# Patient Record
Sex: FEMALE | Race: WHITE | NOT HISPANIC OR LATINO | Employment: UNEMPLOYED | URBAN - METROPOLITAN AREA
[De-identification: names, ages, dates, MRNs, and addresses within clinical notes are randomized per-mention and may not be internally consistent; named-entity substitution may affect disease eponyms.]

---

## 2019-04-19 ENCOUNTER — OFFICE VISIT CONVERTED (OUTPATIENT)
Dept: OTOLARYNGOLOGY | Facility: CLINIC | Age: 34
End: 2019-04-19
Attending: OTOLARYNGOLOGY

## 2019-09-10 ENCOUNTER — OFFICE VISIT CONVERTED (OUTPATIENT)
Dept: ORTHOPEDIC SURGERY | Facility: CLINIC | Age: 34
End: 2019-09-10
Attending: ORTHOPAEDIC SURGERY

## 2019-11-05 ENCOUNTER — OFFICE VISIT CONVERTED (OUTPATIENT)
Dept: ORTHOPEDIC SURGERY | Facility: CLINIC | Age: 34
End: 2019-11-05
Attending: ORTHOPAEDIC SURGERY

## 2019-11-14 ENCOUNTER — HOSPITAL ENCOUNTER (OUTPATIENT)
Dept: MRI IMAGING | Facility: HOSPITAL | Age: 34
Discharge: HOME | End: 2019-11-14
Attending: ORTHOPAEDIC SURGERY

## 2019-11-19 ENCOUNTER — OFFICE VISIT CONVERTED (OUTPATIENT)
Dept: ORTHOPEDIC SURGERY | Facility: CLINIC | Age: 34
End: 2019-11-19
Attending: ORTHOPAEDIC SURGERY

## 2020-07-07 ENCOUNTER — HOSPITAL ENCOUNTER (OUTPATIENT)
Dept: URGENT CARE | Facility: CLINIC | Age: 35
Discharge: HOME | End: 2020-07-07
Attending: NURSE PRACTITIONER

## 2020-08-05 ENCOUNTER — OFFICE VISIT CONVERTED (OUTPATIENT)
Dept: OTOLARYNGOLOGY | Facility: CLINIC | Age: 35
End: 2020-08-05
Attending: OTOLARYNGOLOGY

## 2020-08-05 ENCOUNTER — HOSPITAL ENCOUNTER (OUTPATIENT)
Dept: GENERAL RADIOLOGY | Facility: HOSPITAL | Age: 35
Discharge: HOME | End: 2020-08-05
Attending: OTOLARYNGOLOGY

## 2020-10-03 ENCOUNTER — HOSPITAL ENCOUNTER (OUTPATIENT)
Dept: URGENT CARE | Facility: CLINIC | Age: 35
Discharge: HOME | End: 2020-10-03
Attending: FAMILY MEDICINE

## 2020-10-05 LAB — BACTERIA SPEC AEROBE CULT: NORMAL

## 2021-03-12 ENCOUNTER — OFFICE VISIT CONVERTED (OUTPATIENT)
Dept: ORTHOPEDIC SURGERY | Facility: CLINIC | Age: 36
End: 2021-03-12
Attending: ORTHOPAEDIC SURGERY

## 2021-05-13 NOTE — PROGRESS NOTES
"   Progress Note      Patient Name: Paula Lowe   Patient ID: [id number]   Sex: Female   YOB: 1985    Primary Care Provider: Marie BEGUM   Referring Provider: Marie BEGUM    Visit Date: August 5, 2020    Provider: Luís Martínez MD   Location: [facility]   Location Address: [address] [place] [address], [address] [place] [address]  [id number]   Location Phone: [phone]          Chief Complaint     \"My sinuses have been acting up again.\"       History Of Present Illness     Paula Lowe is a 35 year old female with previous history of tobacco abuse who returns today for evaluation of her sinuses.  She was originally seen on 4/19/2019 at which time she reported frequent left greater than right facial and ear pressure associated with intermittent nasal congestion, sore throat, clear rhinorrhea, and cough.  She tells me that she developed severe bilateral facial and ear pressure on 7/3/2020.  This is been associated with frequent throat clearing and occasional cough.  She also reports some photophobia and phonophobia but denies any nausea or vomiting.  She has been taking Tylenol and Claritin without improvement.  She is also tried Flonase and even was on a 10-day course of Augmentin which she completed on 7/19/2020.  She feels like the Augmentin was helpful but her symptoms never went away fully.  She denies any rhinorrhea, congestion, diminished sense of smell, or epistaxis.  She does have a history of clenching her teeth.  She has not undergone any imaging.           Past Medical History  Chronic rhinitis; Ear pressure, bilateral; Facial pressure; Heart murmur; Nasal congestion; Seasonal allergies         Past Surgical History  Tonsillectomy         Medication List  Claritin 10 mg oral tablet         Allergy List  Latex Exam Gloves         Family Medical History  Melanoma; Basal cell carcinoma         Social History  Alcohol Use (Never); lives with children; lives with " "spouse; .; Recreational Drug Use (Never); Tobacco (Former); Working         Review of Systems  · Constitutional  o Denies  o : fever, night sweats, weight loss  · Eyes  o Denies  o : discharge from eye, impaired vision  · HENT  o Admits  o : *See HPI  · Cardiovascular  o Admits  o : irregular heart beats  o Denies  o : chest pain  · Respiratory  o Denies  o : shortness of breath, wheezing, coughing up blood  · Gastrointestinal  o Admits  o : heartburn, reflux  o Denies  o : vomiting blood  · Genitourinary  o Denies  o : frequency  · Integument  o Denies  o : rash, skin dryness  · Neurologic  o Denies  o : seizures, loss of balance, loss of consciousness, dizziness  · Endocrine  o Denies  o : cold intolerance, heat intolerance  · Heme-Lymph  o Denies  o : easy bleeding, anemia      Vitals  Date Time BP Position Site L\R Cuff Size HR RR TEMP (F) WT  HT  BMI kg/m2 BSA m2 O2 Sat HC       08/05/2020 08:34 AM        98.4 212lbs 4oz 5'  2\" 38.82 2.05           Physical Examination  · Constitutional  o Appearance  o : well developed, well-nourished, alert and in no acute distress, voice clear and strong  · Head and Face  o Head  o :   § Inspection  § : no deformities or lesions  o Face  o :   § Inspection  § : No facial lesions; House-Brackmann I/VI bilaterally  § Palpation  § : TMJ crepitus with mild  muscle tenderness bilaterally  · Eyes  o Vision  o :   § Visual Fields  § : Extraocular movements are intact. No spontaneous or gaze-induced nystagmus.  o Conjunctivae  o : clear  o Sclerae  o : clear  o Pupils and Irises  o : pupils equal, round, and reactive to light.   · Ears, Nose, Mouth and Throat  o Ears  o :   § External Ears  § : appearance within normal limits, no lesions present  § Otoscopic Examination  § : tympanic membrane appearance within normal limits bilaterally without perforations, well-aerated middle ears  § Hearing  § : intact to conversational voice both ears  o Nose  o :   § External " Nose  § : appearance normal  § Intranasal Exam  § : mucosa within normal limits, vestibules normal, no intranasal lesions present, septum deviated to the left, sinuses non tender to percussion  o Oral Cavity  o :   § Oral Mucosa  § : oral mucosa normal without pallor or cyanosis  § Lips  § : lip appearance normal  § Teeth  § : normal dentition for age  § Gums  § : gums pink, non-swollen, no bleeding present  § Tongue  § : tongue appearance normal; normal mobility  § Palate  § : hard palate normal, soft palate appearance normal with symmetric mobility  o Throat  o :   § Oropharynx  § : no inflammation or lesions present, tonsils surgically absent  § Hypopharynx  § : Deferred secondary to gag reflex  § Larynx  § : Deferred secondary to gag reflex  · Neck  o Inspection/Palpation  o : normal appearance, no masses or tenderness, trachea midline; thyroid size normal, nontender, no nodules or masses present on palpation  · Respiratory  o Respiratory Effort  o : breathing unlabored  o Inspection of Chest  o : normal appearance, no retractions  · Cardiovascular  o Heart  o : regular rate and rhythm  · Lymphatic  o Neck  o : no lymphadenopathy present  o Supraclavicular Nodes  o : no lymphadenopathy present  o Preauricular Nodes  o : no lymphadenopathy present  · Skin and Subcutaneous Tissue  o General Inspection  o : Regarding face and neck - there are no rashes present, no lesions present, and no areas of discoloration  · Neurologic  o Cranial Nerves  o : cranial nerves II-XII are grossly intact bilaterally  o Gait and Station  o : normal gait, able to stand without diffculty  · Psychiatric  o Judgement and Insight  o : judgment and insight intact  o Mood and Affect  o : mood normal, affect appropriate          Results     Nasal endoscopy was deferred by the patient.       Assessment  · Facial pain, atypical     350.2/G50.1  · Chronic rhinosinusitis     473.9/J32.9  · Myofascial muscle pain     729.1/M79.18    Problems  Reconciled  Plan  · Orders  o CT Maxillofacial Area without IV Contrast [facility] (39490) - 350.2/G50.1, 473.9/J32.9 - 08/05/2020  · Medications  o Medications have been Reconciled  o Transition of Care or Provider Policy  · Instructions  o Impressions and findings were discussed with Ms. Lowe at great length. Currently, she reports 1 month of bilateral facial and ear pressure which is often associated with photophobia and phonophobia, throat clearing, and occasional cough. Examination today reveals  muscle tenderness to palpation but is otherwise unremarkable. We discussed the differential including intractable migraine versus myofascial muscle pain versus chronic rhinosinusitis. We discussed the utility of performing nasal endoscopy in clinic but she would like to avoid this if at all possible. Therefore, I recommended she undergo a CT scan to evaluate for chronic rhinosinusitis. If this is present she will be treated with a 28-day course of Cipro and if absent we will consider management of potential migraine versus myofascial pain.            Electronically Signed by: Luís Martínez MD -Author on August 5, 2020 10:05:09 AM

## 2021-05-14 VITALS — HEIGHT: 63 IN | WEIGHT: 225 LBS | BODY MASS INDEX: 39.87 KG/M2

## 2021-05-14 NOTE — PROGRESS NOTES
Progress Note      Patient Name: Paula Lowe   Patient ID: [id number]   Sex: Female   YOB: 1985    Primary Care Provider: Marie BEGUM   Referring Provider: Marie BEGUM    Visit Date: March 12, 2021    Provider: Randall Martinez MD   Location: [facility]   Location Address: [address] [place] [address]  [id number]   Location Phone: [phone]          Chief Complaint  · Right knee pain      History Of Present Illness  Paula Lowe is a 35 year old female who presents today to [facility].      The patient presents here today for follow up evaluation of her right knee. She reports pain to the medial and posterior lateral part of the knee. 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 She previously had an MRI over a year ago that shown early osteoarthritis. She has tried physical therapy. She reports instability in her knee.       Past Medical History  Chronic rhinitis; Ear pressure, bilateral; Facial pressure; Heart murmur; Nasal congestion; Seasonal allergies         Past Surgical History  Tonsillectomy         Medication List  Claritin 10 mg oral tablet         Allergy List  Latex Exam Gloves       Allergies Reconciled  Family Medical History  Cancer, Unspecified; Melanoma; Basal cell carcinoma         Social History  Alcohol Use (Never); lives with children; lives with spouse; .; Recreational Drug Use (Never); Tobacco (Former); Unemployed.; Working         Review of Systems  · Constitutional  o Denies  o : fever, chills, weight loss  · Cardiovascular  o Denies  o : chest pain, shortness of breath  · Gastrointestinal  o Denies  o : liver disease, heartburn, nausea, blood in stools  · Genitourinary  o Denies  o : painful urination, blood in urine  · Integument  o Denies  o : rash, itching  · Neurologic  o Denies  o : headache, weakness, loss of consciousness  · Musculoskeletal  o Denies  o : painful, swollen  "joints  · Psychiatric  o Denies  o : drug/alcohol addiction, anxiety, depression      Vitals  Date Time BP Position Site L\R Cuff Size HR RR TEMP (F) WT  HT  BMI kg/m2 BSA m2 O2 Sat FR L/min FiO2 HC       03/12/2021 10:46 AM         224lbs 16oz 5'  3\" 39.86 2.13             Physical Examination  · Constitutional  o Appearance  o : well developed, well-nourished, no obvious deformities present  · Head and Face  o Head  o :   § Inspection  § : normocephalic  o Face  o :   § Inspection  § : no facial lesions  · Eyes  o Conjunctivae  o : conjunctivae normal  o Sclerae  o : sclerae white  · Ears, Nose, Mouth and Throat  o Ears  o :   § External Ears  § : appearance within normal limits  § Hearing  § : intact  o Nose  o :   § External Nose  § : appearance normal  · Neck  o Inspection/Palpation  o : normal appearance  o Range of Motion  o : full range of motion  · Respiratory  o Respiratory Effort  o : breathing unlabored  o Inspection of Chest  o : normal appearance  o Auscultation of Lungs  o : no audible wheezing or rales  · Cardiovascular  o Heart  o : regular rate  · Gastrointestinal  o Abdominal Examination  o : soft and non-tender  · Skin and Subcutaneous Tissue  o General Inspection  o : intact, no rashes  · Psychiatric  o General  o : Alert and oriented x3  o Judgement and Insight  o : judgment and insight intact  o Mood and Affect  o : mood normal, affect appropriate  · Right Knee  o Inspection  o : Extension 0. Flexion 125. Stable to varus and valgus stress. Stable to anterior and posterior drawer. tender on medial joint line. Positive EHL, FHL, GS, and TA. Sensation intact to light touch all 5 nerves of the foot. Positive Pulses.   · In Office Procedures  o View  o : LAT/SUNRISE/STANDING  o Site  o : right, knee  o Indication  o : Right knee pain  o Study  o : X-rays ordered, taken in the office, and reviewed today.  o Xray  o : Negative fracture, dislocation, subluxation. Mild degenerative changes to patella " femoral joint  o Comparative Data  o : Comparative Data found and reviewed today          Assessment  · Right knee pain, unspecified chronicity     719.46/M25.561  · Knee instability, right     718.86/M25.361      Plan  · Orders  o Knee (Right) [facility] Preferred View ([id number]) - 719.46/M25.561 - 03/12/2021  · Medications  o Medications have been Reconciled  o Transition of Care or Provider Policy  · Instructions  o Reviewed the patient's Past Medical, Social, and Family history as well as the ROS at today's visit, no changes.  o Call or return if worsening symptoms.  o The above service was scribed by Beverly Rodrigez on my behalf and I attest to the accuracy of the note. jsb  o Discussed the treatment options with the patient. Plan for a new MRI without contrast. Knee brace given today. Follow up after MRI for results.   o Electronically Identified Patient Education Materials Provided Electronically            Electronically Signed by: Beverly Rodrigez MA -Author on March 15, 2021 09:25:05 AM  Electronically Co-signed by: Randall Martinez MD -Reviewer on March 15, 2021 01:05:55 PM

## 2021-05-15 VITALS — HEIGHT: 62 IN | HEART RATE: 103 BPM | OXYGEN SATURATION: 94 %

## 2021-05-15 VITALS — OXYGEN SATURATION: 98 % | WEIGHT: 226.5 LBS | HEART RATE: 113 BPM | HEIGHT: 62 IN | BODY MASS INDEX: 41.68 KG/M2

## 2021-05-15 VITALS
HEART RATE: 86 BPM | SYSTOLIC BLOOD PRESSURE: 104 MMHG | BODY MASS INDEX: 36.8 KG/M2 | RESPIRATION RATE: 15 BRPM | OXYGEN SATURATION: 98 % | DIASTOLIC BLOOD PRESSURE: 60 MMHG | TEMPERATURE: 98.4 F | HEIGHT: 62 IN | WEIGHT: 200 LBS

## 2021-05-15 VITALS — BODY MASS INDEX: 39.06 KG/M2 | TEMPERATURE: 98.4 F | WEIGHT: 212.25 LBS | HEIGHT: 62 IN

## 2021-05-15 VITALS — HEART RATE: 104 BPM | OXYGEN SATURATION: 97 % | HEIGHT: 62 IN

## 2021-05-18 ENCOUNTER — HOSPITAL ENCOUNTER (OUTPATIENT)
Dept: MRI IMAGING | Facility: HOSPITAL | Age: 36
Discharge: HOME | End: 2021-05-18
Attending: ORTHOPAEDIC SURGERY

## 2021-06-10 ENCOUNTER — OFFICE VISIT (OUTPATIENT)
Dept: ORTHOPEDIC SURGERY | Facility: CLINIC | Age: 36
End: 2021-06-10

## 2021-06-10 VITALS — HEART RATE: 97 BPM | WEIGHT: 215 LBS | HEIGHT: 63 IN | BODY MASS INDEX: 38.09 KG/M2 | OXYGEN SATURATION: 100 %

## 2021-06-10 DIAGNOSIS — M71.21 BAKER'S CYST OF KNEE, RIGHT: ICD-10-CM

## 2021-06-10 DIAGNOSIS — M94.20 CHONDROMALACIA: Primary | ICD-10-CM

## 2021-06-10 PROCEDURE — 99213 OFFICE O/P EST LOW 20 MIN: CPT | Performed by: ORTHOPAEDIC SURGERY

## 2021-06-10 RX ORDER — LORATADINE 10 MG/1
10 TABLET ORAL DAILY PRN
COMMUNITY

## 2021-06-10 RX ORDER — IBUPROFEN 200 MG
TABLET ORAL EVERY 6 HOURS
COMMUNITY
End: 2021-06-10

## 2021-06-10 RX ORDER — MELOXICAM 15 MG/1
15 TABLET ORAL DAILY
Qty: 30 TABLET | Refills: 2 | Status: SHIPPED | OUTPATIENT
Start: 2021-06-10 | End: 2022-04-01
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
## 2021-06-24 ENCOUNTER — APPOINTMENT (OUTPATIENT)
Dept: CT IMAGING | Facility: HOSPITAL | Age: 36
End: 2021-06-24

## 2021-06-24 ENCOUNTER — HOSPITAL ENCOUNTER (EMERGENCY)
Facility: HOSPITAL | Age: 36
Discharge: HOME | End: 2021-06-24
Attending: EMERGENCY MEDICINE | Admitting: EMERGENCY MEDICINE

## 2021-06-24 VITALS
RESPIRATION RATE: 20 BRPM | DIASTOLIC BLOOD PRESSURE: 78 MMHG | HEIGHT: 63 IN | WEIGHT: 228.84 LBS | SYSTOLIC BLOOD PRESSURE: 122 MMHG | TEMPERATURE: 97.6 F | BODY MASS INDEX: 40.55 KG/M2 | HEART RATE: 74 BPM | OXYGEN SATURATION: 98 %

## 2021-06-24 DIAGNOSIS — N39.0 ACUTE UTI: Primary | ICD-10-CM

## 2021-06-24 LAB
ALBUMIN SERPL-MCNC: 4.4 G/DL (ref 3.5–5.2)
ALBUMIN/GLOB SERPL: 1.5 G/DL
ALP SERPL-CCNC: 80 U/L (ref 39–117)
ALT SERPL W P-5'-P-CCNC: 18 U/L (ref 1–33)
ANION GAP SERPL CALCULATED.3IONS-SCNC: 13.3 MMOL/L (ref 5–15)
AST SERPL-CCNC: 18 U/L (ref 1–32)
BACTERIA UR QL AUTO: ABNORMAL /HPF
BASOPHILS # BLD AUTO: 0.04 10*3/MM3 (ref 0–0.2)
BASOPHILS NFR BLD AUTO: 0.7 % (ref 0–1.5)
BILIRUB SERPL-MCNC: 0.7 MG/DL (ref 0–1.2)
BILIRUB UR QL STRIP: NEGATIVE
BUN SERPL-MCNC: 8 MG/DL (ref 6–20)
BUN/CREAT SERPL: 10.1 (ref 7–25)
CALCIUM SPEC-SCNC: 9.2 MG/DL (ref 8.6–10.5)
CHLORIDE SERPL-SCNC: 104 MMOL/L (ref 98–107)
CLARITY UR: ABNORMAL
CO2 SERPL-SCNC: 18.7 MMOL/L (ref 22–29)
COLOR UR: ABNORMAL
CREAT SERPL-MCNC: 0.79 MG/DL (ref 0.57–1)
DEPRECATED RDW RBC AUTO: 40.9 FL (ref 37–54)
EOSINOPHIL # BLD AUTO: 0.2 10*3/MM3 (ref 0–0.4)
EOSINOPHIL NFR BLD AUTO: 3.7 % (ref 0.3–6.2)
ERYTHROCYTE [DISTWIDTH] IN BLOOD BY AUTOMATED COUNT: 13.2 % (ref 12.3–15.4)
GFR SERPL CREATININE-BSD FRML MDRD: 83 ML/MIN/1.73
GLOBULIN UR ELPH-MCNC: 2.9 GM/DL
GLUCOSE SERPL-MCNC: 104 MG/DL (ref 65–99)
GLUCOSE UR STRIP-MCNC: NEGATIVE MG/DL
HCG INTACT+B SERPL-ACNC: <0.5 MIU/ML
HCT VFR BLD AUTO: 38.4 % (ref 34–46.6)
HGB BLD-MCNC: 12.5 G/DL (ref 12–15.9)
HGB UR QL STRIP.AUTO: ABNORMAL
HOLD SPECIMEN: NORMAL
HOLD SPECIMEN: NORMAL
HYALINE CASTS UR QL AUTO: ABNORMAL /LPF
IMM GRANULOCYTES # BLD AUTO: 0.01 10*3/MM3 (ref 0–0.05)
IMM GRANULOCYTES NFR BLD AUTO: 0.2 % (ref 0–0.5)
KETONES UR QL STRIP: ABNORMAL
LEUKOCYTE ESTERASE UR QL STRIP.AUTO: ABNORMAL
LIPASE SERPL-CCNC: 26 U/L (ref 13–60)
LYMPHOCYTES # BLD AUTO: 1.61 10*3/MM3 (ref 0.7–3.1)
LYMPHOCYTES NFR BLD AUTO: 29.5 % (ref 19.6–45.3)
MCH RBC QN AUTO: 27.7 PG (ref 26.6–33)
MCHC RBC AUTO-ENTMCNC: 32.6 G/DL (ref 31.5–35.7)
MCV RBC AUTO: 85 FL (ref 79–97)
MONOCYTES # BLD AUTO: 0.34 10*3/MM3 (ref 0.1–0.9)
MONOCYTES NFR BLD AUTO: 6.2 % (ref 5–12)
NEUTROPHILS NFR BLD AUTO: 3.25 10*3/MM3 (ref 1.7–7)
NEUTROPHILS NFR BLD AUTO: 59.7 % (ref 42.7–76)
NITRITE UR QL STRIP: NEGATIVE
NRBC BLD AUTO-RTO: 0 /100 WBC (ref 0–0.2)
PH UR STRIP.AUTO: 6 [PH] (ref 5–8)
PLATELET # BLD AUTO: 351 10*3/MM3 (ref 140–450)
PMV BLD AUTO: 9.4 FL (ref 6–12)
POTASSIUM SERPL-SCNC: 3.9 MMOL/L (ref 3.5–5.2)
PROT SERPL-MCNC: 7.3 G/DL (ref 6–8.5)
PROT UR QL STRIP: ABNORMAL
RBC # BLD AUTO: 4.52 10*6/MM3 (ref 3.77–5.28)
RBC # UR: ABNORMAL /HPF
REF LAB TEST METHOD: ABNORMAL
SODIUM SERPL-SCNC: 136 MMOL/L (ref 136–145)
SP GR UR STRIP: 1.02 (ref 1–1.03)
SQUAMOUS #/AREA URNS HPF: ABNORMAL /HPF
UROBILINOGEN UR QL STRIP: ABNORMAL
WBC # BLD AUTO: 5.45 10*3/MM3 (ref 3.4–10.8)
WBC UR QL AUTO: ABNORMAL /HPF
WHOLE BLOOD HOLD SPECIMEN: NORMAL

## 2021-06-24 PROCEDURE — 83690 ASSAY OF LIPASE: CPT | Performed by: EMERGENCY MEDICINE

## 2021-06-24 PROCEDURE — 96374 THER/PROPH/DIAG INJ IV PUSH: CPT

## 2021-06-24 PROCEDURE — 80053 COMPREHEN METABOLIC PANEL: CPT | Performed by: EMERGENCY MEDICINE

## 2021-06-24 PROCEDURE — 36415 COLL VENOUS BLD VENIPUNCTURE: CPT | Performed by: EMERGENCY MEDICINE

## 2021-06-24 PROCEDURE — 99283 EMERGENCY DEPT VISIT LOW MDM: CPT

## 2021-06-24 PROCEDURE — 85025 COMPLETE CBC W/AUTO DIFF WBC: CPT | Performed by: EMERGENCY MEDICINE

## 2021-06-24 PROCEDURE — 74177 CT ABD & PELVIS W/CONTRAST: CPT

## 2021-06-24 PROCEDURE — 25010000002 CEFTRIAXONE PER 250 MG: Performed by: EMERGENCY MEDICINE

## 2021-06-24 PROCEDURE — 84702 CHORIONIC GONADOTROPIN TEST: CPT | Performed by: EMERGENCY MEDICINE

## 2021-06-24 PROCEDURE — 0 IOPAMIDOL PER 1 ML: Performed by: EMERGENCY MEDICINE

## 2021-06-24 PROCEDURE — 25010000002 KETOROLAC TROMETHAMINE PER 15 MG: Performed by: EMERGENCY MEDICINE

## 2021-06-24 PROCEDURE — 96375 TX/PRO/DX INJ NEW DRUG ADDON: CPT

## 2021-06-24 PROCEDURE — 81001 URINALYSIS AUTO W/SCOPE: CPT | Performed by: EMERGENCY MEDICINE

## 2021-06-24 RX ORDER — KETOROLAC TROMETHAMINE 30 MG/ML
15 INJECTION, SOLUTION INTRAMUSCULAR; INTRAVENOUS ONCE
Status: COMPLETED | OUTPATIENT
Start: 2021-06-24 | End: 2021-06-24

## 2021-06-24 RX ORDER — SODIUM CHLORIDE 0.9 % (FLUSH) 0.9 %
10 SYRINGE (ML) INJECTION AS NEEDED
Status: DISCONTINUED | OUTPATIENT
Start: 2021-06-24 | End: 2021-06-24 | Stop reason: HOSPADM

## 2021-06-24 RX ORDER — CEPHALEXIN 500 MG/1
500 CAPSULE ORAL 4 TIMES DAILY
Qty: 40 CAPSULE | Refills: 0 | Status: SHIPPED | OUTPATIENT
Start: 2021-06-24 | End: 2022-04-01

## 2021-06-24 RX ADMIN — CEFTRIAXONE SODIUM 1 G: 1 INJECTION, POWDER, FOR SOLUTION INTRAMUSCULAR; INTRAVENOUS at 14:56

## 2021-06-24 RX ADMIN — IOPAMIDOL 100 ML: 755 INJECTION, SOLUTION INTRAVENOUS at 16:30

## 2021-06-24 RX ADMIN — KETOROLAC TROMETHAMINE 15 MG: 30 INJECTION, SOLUTION INTRAMUSCULAR; INTRAVENOUS at 14:56

## 2021-11-09 ENCOUNTER — TELEPHONE (OUTPATIENT)
Dept: OBSTETRICS AND GYNECOLOGY | Facility: CLINIC | Age: 36
End: 2021-11-09

## 2021-11-09 DIAGNOSIS — N93.9 ABNORMAL UTERINE BLEEDING (AUB): Primary | ICD-10-CM

## 2021-11-09 RX ORDER — MEDROXYPROGESTERONE ACETATE 10 MG/1
10 TABLET ORAL DAILY
Qty: 10 TABLET | Refills: 0 | Status: SHIPPED | OUTPATIENT
Start: 2021-11-09 | End: 2022-04-01

## 2021-11-09 NOTE — TELEPHONE ENCOUNTER
I sent a prescription for Provera for the next 10 days to her pharmacy.  Start that today to help decrease the AUB

## 2021-11-09 NOTE — TELEPHONE ENCOUNTER
Spoke with pt and let her know RX for provera has been sent to her pharmacy. Adv to take for the full ten days even  if she were to stop bleeding and keep fu with Erica

## 2021-11-09 NOTE — TELEPHONE ENCOUNTER
Pt called stating she has been bleeding for 19 days, she states it is heavy to where she is changing her pad and bleeding through her clothes every couple of hours. She is having some cramping with it. I have scheduled her an appt with Erica for next week but she wanted to me to check with you to see if there was any other recommendations since you have seen her for the issue. I will put her chart in your area.

## 2021-11-15 ENCOUNTER — OFFICE VISIT (OUTPATIENT)
Dept: OBSTETRICS AND GYNECOLOGY | Facility: CLINIC | Age: 36
End: 2021-11-15

## 2021-11-15 VITALS
BODY MASS INDEX: 42.43 KG/M2 | DIASTOLIC BLOOD PRESSURE: 83 MMHG | SYSTOLIC BLOOD PRESSURE: 118 MMHG | WEIGHT: 232 LBS | HEART RATE: 92 BPM

## 2021-11-15 DIAGNOSIS — N94.6 DYSMENORRHEA: ICD-10-CM

## 2021-11-15 DIAGNOSIS — N92.1 MENORRHAGIA WITH IRREGULAR CYCLE: Primary | ICD-10-CM

## 2021-11-15 LAB
B-HCG UR QL: NEGATIVE
EXPIRATION DATE: NORMAL
INTERNAL NEGATIVE CONTROL: NEGATIVE
INTERNAL POSITIVE CONTROL: POSITIVE
Lab: NORMAL

## 2021-11-15 PROCEDURE — 81025 URINE PREGNANCY TEST: CPT | Performed by: OBSTETRICS & GYNECOLOGY

## 2021-11-15 PROCEDURE — 99214 OFFICE O/P EST MOD 30 MIN: CPT | Performed by: OBSTETRICS & GYNECOLOGY

## 2021-11-15 NOTE — PROGRESS NOTES
GYN Problem/Follow Up Visit    Chief Complaint   Patient presents with   • Follow-up     AUB           HPI  Paula Lowe is a 36 y.o. female, No obstetric history on file., who presents for heavy painful menses. States periods have been getting worse for several months. Will sometimes have two menses in one month. Saw dr moe in august for same sx. Workup normal except 2.5 cm lt ovarian cyst. Had discussed mirena at that time but pt decided against it. States this menses has lasted 19 days. Called office and dr moe prescribed provera. Took it Friday and Saturday and bleeding stopped. Forgot to take it Sunday and bleeding started back up again Sunday evening. Took it this morning and states bleeding is lighter now. Has changed pads about q4h today. C/o bilateral cramping.     Additional OB/GYN History   Patient's last menstrual period was 10/23/2021.  Current contraception: contraceptive methods: None  Desires to: do not start contraception  Allergies : Latex     The additional following portions of the patient's history were reviewed and updated as appropriate: allergies, current medications, past family history, past medical history, past social history, past surgical history and problem list.    Review of Systems    I have reviewed and agree with the HPI, ROS, and historical information as entered above. Joyce Valerio, APRN    Objective   /83   Pulse 92   Wt 105 kg (232 lb)   LMP 10/23/2021   BMI 42.43 kg/m²     Physical Exam  Vitals reviewed.   Abdominal:      Palpations: Abdomen is soft.      Tenderness: There is no abdominal tenderness.      Comments: No pelvic tenderness noted.    Neurological:      Mental Status: She is alert and oriented to person, place, and time.            Assessment and Plan    Diagnoses and all orders for this visit:    1. Menorrhagia with irregular cycle (Primary)  -     US Pelvis Transvaginal Non OB; Future  -     POC Pregnancy, Urine    2. Dysmenorrhea  -     US Pelvis  Transvaginal Non OB; Future    reviewed workup done in august: cbc, fsh, tsh, estradiol normal. Pelvic u/s: 2.5 cm lt ovarian cyst  Repeat pelvic u/s to monitor cyst and check endometrial thickness. Continue provera. Discussed tx options: mirena, pop, lo lo. Also discussed ablation but pt is not on any bc and states very sensitive to hormonal bc. Will discuss further at u/s f/u.    Counseling:  She understands the importance of having the above orders performed in a timely fashion.  She is encouraged to review her results online and/or contact or office if she has questions.     Follow Up:  Return for u/s f/u.      Joyce Valerio, SHY  11/15/2021

## 2021-12-08 ENCOUNTER — APPOINTMENT (OUTPATIENT)
Dept: ULTRASOUND IMAGING | Facility: HOSPITAL | Age: 36
End: 2021-12-08

## 2022-01-04 ENCOUNTER — APPOINTMENT (OUTPATIENT)
Dept: ULTRASOUND IMAGING | Facility: HOSPITAL | Age: 37
End: 2022-01-04

## 2022-01-06 ENCOUNTER — APPOINTMENT (OUTPATIENT)
Dept: ULTRASOUND IMAGING | Facility: HOSPITAL | Age: 37
End: 2022-01-06

## 2022-01-14 ENCOUNTER — TELEPHONE (OUTPATIENT)
Dept: ORTHOPEDIC SURGERY | Facility: CLINIC | Age: 37
End: 2022-01-14
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
## 2022-01-31 ENCOUNTER — HOSPITAL ENCOUNTER (OUTPATIENT)
Dept: ULTRASOUND IMAGING | Facility: HOSPITAL | Age: 37
Discharge: HOME | End: 2022-01-31
Admitting: OBSTETRICS & GYNECOLOGY

## 2022-01-31 DIAGNOSIS — N92.1 MENORRHAGIA WITH IRREGULAR CYCLE: ICD-10-CM

## 2022-01-31 DIAGNOSIS — N94.6 DYSMENORRHEA: ICD-10-CM

## 2022-01-31 PROCEDURE — 76830 TRANSVAGINAL US NON-OB: CPT

## 2022-01-31 PROCEDURE — 76856 US EXAM PELVIC COMPLETE: CPT

## 2022-02-03 ENCOUNTER — OFFICE VISIT (OUTPATIENT)
Dept: ORTHOPEDIC SURGERY | Facility: CLINIC | Age: 37
End: 2022-02-03

## 2022-02-03 VITALS — HEIGHT: 62 IN | WEIGHT: 237 LBS | BODY MASS INDEX: 43.61 KG/M2

## 2022-02-03 DIAGNOSIS — M75.41 IMPINGEMENT SYNDROME OF RIGHT SHOULDER: ICD-10-CM

## 2022-02-03 DIAGNOSIS — M25.511 RIGHT SHOULDER PAIN, UNSPECIFIED CHRONICITY: Primary | ICD-10-CM

## 2022-02-03 DIAGNOSIS — M75.51 BURSITIS OF RIGHT SHOULDER: ICD-10-CM

## 2022-02-03 PROCEDURE — 99213 OFFICE O/P EST LOW 20 MIN: CPT | Performed by: ORTHOPAEDIC SURGERY

## 2022-02-03 PROCEDURE — 20610 DRAIN/INJ JOINT/BURSA W/O US: CPT | Performed by: ORTHOPAEDIC SURGERY

## 2022-02-03 RX ORDER — LEVOTHYROXINE SODIUM 0.03 MG/1
TABLET ORAL
COMMUNITY

## 2022-02-03 RX ORDER — FERROUS FUMARATE 324(106)MG
TABLET ORAL
COMMUNITY
End: 2022-04-01

## 2022-02-03 RX ORDER — ERGOCALCIFEROL 1.25 MG/1
CAPSULE ORAL
COMMUNITY

## 2022-02-03 RX ORDER — LIFITEGRAST 50 MG/ML
SOLUTION/ DROPS OPHTHALMIC
COMMUNITY
Start: 2022-01-27

## 2022-02-03 RX ADMIN — TRIAMCINOLONE ACETONIDE 40 MG: 40 INJECTION, SUSPENSION INTRA-ARTICULAR; INTRAMUSCULAR at 08:36

## 2022-02-03 RX ADMIN — BUPIVACAINE HYDROCHLORIDE 5 ML: 2.5 INJECTION, SOLUTION INFILTRATION; PERINEURAL at 08:36
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
## 2022-02-06 RX ORDER — TRIAMCINOLONE ACETONIDE 40 MG/ML
40 INJECTION, SUSPENSION INTRA-ARTICULAR; INTRAMUSCULAR
Status: COMPLETED | OUTPATIENT
Start: 2022-02-03 | End: 2022-02-03

## 2022-02-06 RX ORDER — BUPIVACAINE HYDROCHLORIDE 2.5 MG/ML
5 INJECTION, SOLUTION INFILTRATION; PERINEURAL
Status: COMPLETED | OUTPATIENT
Start: 2022-02-03 | End: 2022-02-03

## 2022-02-07 ENCOUNTER — OFFICE VISIT (OUTPATIENT)
Dept: OBSTETRICS AND GYNECOLOGY | Facility: CLINIC | Age: 37
End: 2022-02-07

## 2022-02-07 VITALS
DIASTOLIC BLOOD PRESSURE: 77 MMHG | WEIGHT: 233 LBS | BODY MASS INDEX: 42.88 KG/M2 | HEART RATE: 66 BPM | SYSTOLIC BLOOD PRESSURE: 102 MMHG | HEIGHT: 62 IN

## 2022-02-07 DIAGNOSIS — N93.9 ABNORMAL UTERINE BLEEDING: Primary | ICD-10-CM

## 2022-02-07 DIAGNOSIS — N94.6 DYSMENORRHEA: ICD-10-CM

## 2022-02-07 PROCEDURE — 99212 OFFICE O/P EST SF 10 MIN: CPT | Performed by: OBSTETRICS & GYNECOLOGY

## 2022-02-07 NOTE — PROGRESS NOTES
"GYN Problem/Follow Up Visit    Chief Complaint   Patient presents with   • Follow up us           HPI  Paula Lowe is a 36 y.o. female, No obstetric history on file., who presents for u/s f/u. Seen in office 11/15/21 for heavy painful menses. States periods have been getting worse for several months. Will sometimes have two menses in one month. Saw dr moe in august for same sx. Workup normal except 2.5 cm lt ovarian cyst. Had discussed mirena at that time but pt decided against it. States last menses has lasted 19 days. Called office and dr moe prescribed provera. Bleeding stopped and she has not had any since. Has been having increased cramping.       Additional OB/GYN History   Patient's last menstrual period was 12/09/2021 (approximate).  Current contraception: contraceptive methods: None  Desires to: do not start contraception  Allergies : Latex     The additional following portions of the patient's history were reviewed and updated as appropriate: allergies, current medications, past family history, past medical history, past social history, past surgical history and problem list.    Review of Systems    I have reviewed and agree with the HPI, ROS, and historical information as entered above. Joyce Valerio, APRN    Objective   /77   Pulse 66   Ht 157.5 cm (62\")   Wt 106 kg (233 lb)   LMP 12/09/2021 (Approximate)   BMI 42.62 kg/m²     Physical Exam  Vitals reviewed.   Neurological:      Mental Status: She is alert and oriented to person, place, and time.            Assessment and Plan    Diagnoses and all orders for this visit:    1. Abnormal uterine bleeding (Primary)    2. Dysmenorrhea    reviewed labs done by dr moe. Reviewed pelvic u/s done 1/31/22: stripe 10 mm, 1.8 cm uterine fibroid, 3.5 cm right ovarian functional cyst. Discussed tx options. Pt states her pcp did labs and dx her with hashimoto's and she recently started meds for that and anemia. Wants to monitor sx and f/u prn. "     Counseling:  She understands the importance of having the above orders performed in a timely fashion.  She is encouraged to review her results online and/or contact or office if she has questions.     Follow Up:  Return if symptoms worsen or fail to improve.      SHY Castillo  02/07/2022

## 2022-02-25 ENCOUNTER — TREATMENT (OUTPATIENT)
Dept: PHYSICAL THERAPY | Facility: CLINIC | Age: 37
End: 2022-02-25

## 2022-02-25 DIAGNOSIS — S49.91XA INJURY OF RIGHT SHOULDER, INITIAL ENCOUNTER: Primary | ICD-10-CM

## 2022-02-25 DIAGNOSIS — M25.511 ACUTE PAIN OF RIGHT SHOULDER: ICD-10-CM

## 2022-02-25 PROCEDURE — 97110 THERAPEUTIC EXERCISES: CPT | Performed by: PHYSICAL THERAPIST

## 2022-02-25 PROCEDURE — 97161 PT EVAL LOW COMPLEX 20 MIN: CPT | Performed by: PHYSICAL THERAPIST
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
## 2022-02-28 ENCOUNTER — TREATMENT (OUTPATIENT)
Dept: PHYSICAL THERAPY | Facility: CLINIC | Age: 37
End: 2022-02-28

## 2022-02-28 DIAGNOSIS — S49.91XA INJURY OF RIGHT SHOULDER, INITIAL ENCOUNTER: ICD-10-CM

## 2022-02-28 DIAGNOSIS — M25.511 ACUTE PAIN OF RIGHT SHOULDER: Primary | ICD-10-CM

## 2022-02-28 PROCEDURE — 97110 THERAPEUTIC EXERCISES: CPT | Performed by: PHYSICAL THERAPIST

## 2022-02-28 NOTE — PROGRESS NOTES
Physical Therapy Daily Treatment Note      Patient: Paula Lowe   : 1985  Referring practitioner: No ref. provider found  Date of Initial Visit: Type: THERAPY  Noted: 2022  Today's Date: 2022  Patient seen for 2 sessions           Subjective   Paula Lowe reports: She was sore from HEP, otherwise she is good.    Objective   See Exercise, Manual, and Modality Logs for complete treatment.     Assessment & Plan     Assessment  Impairments: abnormal or restricted ROM and pain with function    Assessment details: Pt complained of soreness with exercises, but tolerated it well overall.    Plan  Therapy options: will be seen for skilled therapy services  Planned therapy interventions: manual therapy  Plan details: Continue with POC.      Visit Diagnoses:    ICD-10-CM ICD-9-CM   1. Acute pain of right shoulder  M25.511 719.41   2. Injury of right shoulder, initial encounter  S49.91XA 959.2     Progress per Plan of Care         Timed:  Manual Therapy:    0     mins  81489;  Therapeutic Exercise:    26     mins  56008;     Neuromuscular Nahun:    0    mins  34633;    Therapeutic Activity:     0     mins  25401;     Gait Trainin     mins  86841;     Aquatic Therapy     0     mins  88842;     Ultrasound:     0     mins  61549;    Electrical Stimulation:    0     mins  90631 ( );    Untimed:  Electrical Stimulation:    0     mins  26509 ( );  Mechanical Traction:    0     mins  25958;     Timed Treatment:   26   mins   Total Treatment:     26   mins  Karo Mendez PT    [place] License: [id number]

## 2022-03-07 ENCOUNTER — TELEPHONE (OUTPATIENT)
Dept: PHYSICAL THERAPY | Facility: CLINIC | Age: 37
End: 2022-03-07

## 2022-03-10 ENCOUNTER — TREATMENT (OUTPATIENT)
Dept: PHYSICAL THERAPY | Facility: CLINIC | Age: 37
End: 2022-03-10

## 2022-03-10 DIAGNOSIS — M25.511 ACUTE PAIN OF RIGHT SHOULDER: Primary | ICD-10-CM

## 2022-03-10 DIAGNOSIS — S49.91XA INJURY OF RIGHT SHOULDER, INITIAL ENCOUNTER: ICD-10-CM

## 2022-03-10 PROCEDURE — 97530 THERAPEUTIC ACTIVITIES: CPT | Performed by: PHYSICAL THERAPIST

## 2022-03-10 PROCEDURE — 97110 THERAPEUTIC EXERCISES: CPT | Performed by: PHYSICAL THERAPIST

## 2022-03-10 NOTE — PROGRESS NOTES
Physical Therapy Daily Progress Note    Patient: Palua Lowe   : 1985  Diagnosis/ICD-10 Code:  Acute pain of right shoulder [M25.511]  Referring practitioner: Randall Martinez MD  Date of Initial Visit: Type: THERAPY  Noted: 2022  Today's Date: 3/10/2022  Patient seen for 3 sessions           Subjective   The patient reported that her pain level today is a 1-2/10 prior to starting therapy. She is still experiencing intermitting tingling into her right arm.    Objective   See Exercise, Manual, and Modality Logs for complete treatment.     Assessment/Plan  The patient demonstrated good tolerance to exercise progression today. Continue to progress scapular strengthening exercise per patient tolerance.       Timed:  Manual Therapy:    4     mins  09802;  Therapeutic Exercise:    16     mins  37294;     Neuromuscular Nahun:   0    mins  14463;    Therapeutic Activity:     12     mins  90594;     Gait Trainin     mins  72986;     Aquatics                         0      mins  77868    Un-timed:  Mechanical Traction      0     mins  80688  Dry Needling     0     mins self-pay  Electrical Stimulation:    0     mins  01764 ( );      Timed Treatment:   32   mins   Total Treatment:     32   mins    Prem Douglas PT  Physical Therapist    Electronically signed 3/10/2022    [place] License: PT - [id number]

## 2022-03-14 ENCOUNTER — TREATMENT (OUTPATIENT)
Dept: PHYSICAL THERAPY | Facility: CLINIC | Age: 37
End: 2022-03-14

## 2022-03-14 DIAGNOSIS — S49.91XA INJURY OF RIGHT SHOULDER, INITIAL ENCOUNTER: ICD-10-CM

## 2022-03-14 DIAGNOSIS — M25.511 ACUTE PAIN OF RIGHT SHOULDER: Primary | ICD-10-CM

## 2022-03-14 PROCEDURE — 97140 MANUAL THERAPY 1/> REGIONS: CPT | Performed by: PHYSICAL THERAPIST

## 2022-03-14 PROCEDURE — 97110 THERAPEUTIC EXERCISES: CPT | Performed by: PHYSICAL THERAPIST

## 2022-03-14 NOTE — PROGRESS NOTES
Physical Therapy Daily Progress Note    VISIT#: 4    Subjective   Paula Lowe reports she woke up yesterday and her arm was very swollen and numb.  She states it took about an hour before the numbness went away.  She reports 5/10 pain this morning. She states during the day she doesn't have much numbness.    Pain Rating (0-10): 5    Objective     See Exercise, Manual, and Modality Logs for complete treatment.     Assessment/Plan  Multiple stretches for the neck were added today to help improve mobility and decrease radicular symptoms into R UE.  Pt experienced nausea due to pain while performing levator stretch therefore it was stopped.  Manual therapy was performed to neck to help decrease pain.  Pt has multiple tender spots through upper trap and levator.  Following manual therapy, pt had decreased pain and improved R shoulder ROM.  Pt was given upper trap, levator, and scalene stretch for her home program.     Progress per Plan of Care and Progress strengthening /stabilization /functional activity            Timed:         Manual Therapy:    15     mins  12869;     Therapeutic Exercise:    15     mins  97478;     Neuromuscular Nahun:        mins  49411;    Therapeutic Activity:          mins  65382;     Gait Training:           mins  78255;     Ultrasound:          mins  57371;    Ionto                                   mins   13219  Self Care                            mins   44866  Canalith Repos                   mins  09755    Un-Timed:  Electrical Stimulation:         mins  30797 ( );  Dry Needling          mins self-pay  Traction          mins 17330  Low Eval          Mins  19800  Mod Eval          Mins  18461  High Eval                            Mins  36491  Re-Eval                               mins  58257    Timed Treatment:   30   mins   Total Treatment:     30   mins    Joyce Horvath PT  Physical Therapist  [place] License: [id number]

## 2022-03-15 ENCOUNTER — OFFICE VISIT (OUTPATIENT)
Dept: SURGERY | Facility: CLINIC | Age: 37
End: 2022-03-15

## 2022-03-15 ENCOUNTER — PREP FOR SURGERY (OUTPATIENT)
Dept: OTHER | Facility: HOSPITAL | Age: 37
End: 2022-03-15

## 2022-03-15 VITALS — WEIGHT: 235.6 LBS | HEIGHT: 63 IN | RESPIRATION RATE: 14 BRPM | BODY MASS INDEX: 41.75 KG/M2

## 2022-03-15 DIAGNOSIS — K21.9 GASTROESOPHAGEAL REFLUX DISEASE, UNSPECIFIED WHETHER ESOPHAGITIS PRESENT: Primary | ICD-10-CM

## 2022-03-15 DIAGNOSIS — K21.9 GERD WITHOUT ESOPHAGITIS: Primary | ICD-10-CM

## 2022-03-15 PROBLEM — M25.511 RIGHT SHOULDER PAIN: Status: ACTIVE | Noted: 2022-03-15

## 2022-03-15 PROCEDURE — 99203 OFFICE O/P NEW LOW 30 MIN: CPT | Performed by: SURGERY

## 2022-03-15 RX ORDER — PANTOPRAZOLE SODIUM 20 MG/1
20 TABLET, DELAYED RELEASE ORAL DAILY
Qty: 30 TABLET | Refills: 1 | Status: SHIPPED | OUTPATIENT
Start: 2022-03-15 | End: 2022-07-25

## 2022-03-15 RX ORDER — SODIUM CHLORIDE 0.9 % (FLUSH) 0.9 %
3 SYRINGE (ML) INJECTION EVERY 12 HOURS SCHEDULED
Status: CANCELLED | OUTPATIENT
Start: 2022-03-15

## 2022-03-15 RX ORDER — SODIUM CHLORIDE 0.9 % (FLUSH) 0.9 %
10 SYRINGE (ML) INJECTION AS NEEDED
Status: CANCELLED | OUTPATIENT
Start: 2022-03-15

## 2022-03-16 ENCOUNTER — TREATMENT (OUTPATIENT)
Dept: PHYSICAL THERAPY | Facility: CLINIC | Age: 37
End: 2022-03-16

## 2022-03-16 DIAGNOSIS — S49.91XA INJURY OF RIGHT SHOULDER, INITIAL ENCOUNTER: ICD-10-CM

## 2022-03-16 DIAGNOSIS — M25.511 ACUTE PAIN OF RIGHT SHOULDER: Primary | ICD-10-CM

## 2022-03-16 PROCEDURE — 97140 MANUAL THERAPY 1/> REGIONS: CPT | Performed by: PHYSICAL THERAPIST

## 2022-03-16 NOTE — PROGRESS NOTES
Physical Therapy Daily Progress Note    VISIT#: 5    Subjective   Paula Lowe reports she had relief of pain after last session until that night.       Objective     See Exercise, Manual, and Modality Logs for complete treatment.     Assessment/Plan    Focused session primarily on manual therapy due to patient's pain and patient had increase in pain with cervical rotation to R prior to manual therapy, after manual therapy patient able to rotate to the R with no pain. Added thread the needle exercise to continue to improve thoracic mobility and patient able to perform but did have increase in pain with exercise. Educated patient to continue exercises at home.    Progress per Plan of Care and Progress strengthening /stabilization /functional activity            Timed:                 Manual Therapy:    25     mins  45129;     Therapeutic Exercise:    2     mins  82880;     Neuromuscular Nahun:    0    mins  23478;    Therapeutic Activity:     0     mins  26230;     Gait Trainin     mins  93047;     Ultrasound:     0     mins  91649;    Ionto                               0    mins   41751  Self pay                         0     mins [id number]    Un-Timed:  Electrical Stimulation:    0     mins  43571 ( )  Canalith Repos    0     mins 61476  Dry Needling     0     mins self-pay  Traction     0     mins 60141    Timed Treatment:   27   mins   Total Treatment:     27   mins    Ginna Alonzo PT  Physical Therapist    PT SIGNATURE: Electronically signed by Ginna Alonzo PT  [place] LICENSE: [id number]

## 2022-03-21 NOTE — PROGRESS NOTES
General Surgery/Colorectal Surgery Note    Patient Name:  Paula Lowe  YOB: 1985  [id number]    Referring Provider: Michaela Prince APRN      Patient Care Team:  Michaela Prince APRN as PCP - General (Nurse Practitioner)    Chief complaint reflux    Subjective .     History of present illness:    1 year history of worsening reflux with acid taste in her mouth.  Having to sleep sitting up.  Sore throat.  Sinusitis.  Previous heart work-up normal per the patient.  Midsternal burning and dysphagia.  No painful swallowing.  No vomiting recently digested foods.  Grandfather with esophageal cancer.  No history of ulcer disease.  No NSAID abuse.  No tobacco use.  No previous abdominal surgery.  No current treatment.      History:  Past Medical History:   Diagnosis Date   • Allergies    • Heart murmur    • Leaky heart valve        Past Surgical History:   Procedure Laterality Date   • TONSILLECTOMY         No family history on file.    Social History     Tobacco Use   • Smoking status: Former Smoker     Packs/day: 1.00     Years: 10.00     Pack years: 10.00     Types: Cigarettes     Quit date: 2016     Years since quittin.2   • Smokeless tobacco: Never Used   Vaping Use   • Vaping Use: Never used   Substance Use Topics   • Alcohol use: Not Currently       Review of Systems  All systems were reviewed and negative except for:   Review of Systems   Constitutional: Negative for chills, fever and unexpected weight loss.   HENT: Negative for congestion, nosebleeds and voice change.    Eyes: Negative for blurred vision, double vision and discharge.   Respiratory: Negative for apnea, chest tightness and shortness of breath.    Cardiovascular: Negative for chest pain and leg swelling.   Gastrointestinal:        See HPI   Endocrine: Negative for cold intolerance and heat intolerance.   Genitourinary: Negative for dysuria, hematuria and urgency.   Musculoskeletal: Negative for back pain, joint swelling  and neck pain.   Skin: Negative for color change and dry skin.   Neurological: Negative for dizziness and confusion.   Hematological: Negative for adenopathy.   Psychiatric/Behavioral: Negative for agitation and behavioral problems.     MEDS:  Prior to Admission medications    Medication Sig Start Date End Date Taking? Authorizing Provider   ergocalciferol (ERGOCALCIFEROL) 1.25 MG (68723 UT) capsule ergocalciferol (vitamin D2) 1,250 mcg (50,000 unit) capsule   Yes Robert Aden MD   levothyroxine (SYNTHROID, LEVOTHROID) 25 MCG tablet levothyroxine 25 mcg tablet   Yes Robert Aden MD   loratadine (CLARITIN) 10 MG tablet Claritin 10 mg oral tablet take 1 tablet (10 mg) by oral route once daily   Active   Yes Robert Aden MD   medroxyPROGESTERone (Provera) 10 MG tablet Take 1 tablet by mouth Daily. 11/9/21  Yes Chiara Major MD   Pediatric Multivitamins-Iron (FLINTSTONES W/IRON PO) Flintstones Gummies chewable tablet   Take by oral route.   Yes Robert Aden MD   cephalexin (KEFLEX) 500 MG capsule Take 1 capsule by mouth 4 (Four) Times a Day. 6/24/21   Deidre Mendez MD   Ferrous Fumarate 324 (106 Fe) MG tablet ferrous fumarate 324 mg (106 mg iron) tablet   Take 1 tablet every day by oral route for 90 days.    Robert Aden MD   meloxicam (Mobic) 15 MG tablet Take 1 tablet by mouth Daily. 6/10/21   Randall Martinez MD   multivitamin with minerals tablet tablet Take 1 tablet by mouth Daily.    Emergency, Nurse Epic, RN   pantoprazole (Protonix) 20 MG EC tablet Take 1 tablet by mouth Daily. 3/15/22 3/15/23  Indra Boo MD   Xiidra 5 % ophthalmic solution  1/27/22   ProviderRobert MD        Allergies:  Latex    Objective     Vital Signs        Physical Exam:     General Appearance:    Alert, cooperative, in no acute distress   Head:    Normocephalic, without obvious abnormality, atraumatic   Eyes:          Conjunctivae and sclerae normal, no  "icterus,     Ears:    Ears appear intact with no abnormalities noted   Throat:   No oral lesions, no thrush, oral mucosa moist   Neck:   No adenopathy, supple, trachea midline, no thyromegaly   Back:     No kyphosis present, no scoliosis present, no skin lesions,      erythema or scars, no tenderness to percussion or                   palpation,   range of motion normal   Lungs:     Clear to auscultation,respirations regular, even and                  unlabored    Heart:    Regular rhythm and normal rate, normal S1 and S2, no            murmur, no gallop, no rub, no click   Chest Wall:    No abnormalities observed   Abdomen:     Normal bowel sounds, no masses, no organomegaly, soft        non-tender, non-distended, no guarding, no rebound                tenderness   Rectal:        Extremities:   Moves all extremities well, no edema, no cyanosis, no             redness   Pulses:   Pulses palpable and equal bilaterally   Skin:   No bleeding, bruising or rash   Lymph nodes:   No palpable adenopathy   Neurologic:   A/o x 4 with no deficits       Results Review:   {Results Review:35896::\"I reviewed the patient's new clinical results.\"    LABS/IMAGING:  Results for orders placed or performed in visit on 11/15/21   POC Pregnancy, Urine    Specimen: Urine   Result Value Ref Range    HCG, Urine, QL Negative Negative    Lot Number [id number]     Internal Positive Control Positive Positive, Passed    Internal Negative Control Negative Negative, Passed    Expiration Date 01/31/2023         Result Review :     Assessment/Plan     GERD     I recommended starting her on a PPI.  Prescription given.  I recommended EGD for evaluation of her anatomy and biopsies.  Benefits and alternatives discussed.  Risk of procedure including bleeding perforation discussed.  All questions answered.  She agrees with the plan.  Orders placed.  If her symptoms do not improve on PPI or she has significant findings on her EGD, I may recommend reflux " surgery.  Thank you for the consult.        This document has been electronically signed by Indra Boo MD  March 21, 2022 11:09 EDT

## 2022-03-28 ENCOUNTER — TREATMENT (OUTPATIENT)
Dept: PHYSICAL THERAPY | Facility: CLINIC | Age: 37
End: 2022-03-28

## 2022-03-28 DIAGNOSIS — M54.12 RADICULOPATHY, CERVICAL: ICD-10-CM

## 2022-03-28 DIAGNOSIS — S49.91XA INJURY OF RIGHT SHOULDER, INITIAL ENCOUNTER: ICD-10-CM

## 2022-03-28 DIAGNOSIS — M25.511 ACUTE PAIN OF RIGHT SHOULDER: Primary | ICD-10-CM

## 2022-03-28 PROCEDURE — 97110 THERAPEUTIC EXERCISES: CPT | Performed by: PHYSICAL THERAPIST

## 2022-03-28 PROCEDURE — 97140 MANUAL THERAPY 1/> REGIONS: CPT | Performed by: PHYSICAL THERAPIST

## 2022-03-28 NOTE — PROGRESS NOTES
"Progress note      Patient: Paula Lowe   : 1985  Diagnosis/ICD-10 Code:  Acute pain of right shoulder [M25.511]  Referring practitioner: Randall Martinez MD  Date of Initial Visit: Type: THERAPY  Noted: 2022  Today's Date: 3/28/2022  Patient seen for 6 sessions    Progress note due: 2022  Re-cert due: 2022      Subjective:   Paula Lowe reports: 2/10 R shoulder pain at beginning of session today.  Pt reports that overhead reaching and lifting are still difficult due to R shoulder pain and weakness.  Pt states that she has noticed overall improvements in pain and function but \"still have some areas to work on\".  Pt reports that she still has intermittent R shoulder radicular pain and numbness \"to all fingers\".  Subjective Questionnaire: UEFS: 62/80  Clinical Progress: improved  Treatment has included: therapeutic exercise and manual therapy    Subjective   Objective          Active Range of Motion   Left Shoulder   Normal active range of motion    Right Shoulder   Flexion: 131 degrees with pain  Abduction: 131 degrees with pain  External rotation 0°: 55 degrees with pain  Internal rotation BTB: T9 with pain    Strength/Myotome Testing     Right Shoulder     Planes of Motion   Flexion: 4   Extension: 4   Abduction: 4   External rotation at 0°: 4-   Internal rotation at 0°: 4     Tests   Cervical     Right   Positive active compression (Canones), cervical distraction and Spurling's sign.      General Comments     Shoulder Comments   Significantly increased R upper trap and levator scapulae tightness noted on R than L. Sub-occipital tightness noted bilaterally.      Assessment & Plan     Assessment  Impairments: abnormal or restricted ROM, impaired balance and pain with function  Functional Limitations: walking, uncomfortable because of pain and standing  Assessment details: Pt demonstrates some improvements in reports of pain/function during the day but continues to demonstrate R shoulder " weakness and decreased ROM as well as reports of pain limiting function as shown on the UEFS.  Pt also demonstrates signs/symptoms of cervical radiculopathy due to reports of intermittent R radicular symptoms and positive Spurlings/cervical compression tests. Pt would continue to benefit from skilled PT services in order to address remaining deficits limiting function at this time.     Goals  Plan Goals: SHOULDER  PROBLEMS:      1. The patient has limited ROM of the R shoulder.    LTG 1: 6 weeks:  The patient will demonstrate 170 degrees of R shoulder flexion, 170 degrees of shoulder abduction, 70 degrees of shoulder external rotation, and 70 degrees of shoulder internal rotation to allow the patient to reach into upper kitchen cabinets and manipulate clothing behind the back with greater ease.    STATUS:  ongoing   STG 1a: 3 weeks:  The patient will demonstrate 150 degrees of R shoulder flexion, 150 degrees of shoulder abduction, 60 degrees of shoulder external rotation, and 60 degrees of shoulder internal rotation.    STATUS:  Not met, continue   TREATMENT: Manual therapy, therapeutic exercise, home exercise instruction, and modalities as needed to include: electrical stimulation, ultrasound, moist heat, and ice.    2. The patient has limited strength of the R shoulder.   LTG 2: 6 weeks:  The patient will demonstrate 5 /5 strength for R shoulder flexion, abduction, external rotation, and internal rotation in order to demonstrate improved shoulder stability.    STATUS:  ongoing   STG 2a: 3 weeks:  The patient will demonstrate 4 /5 strength for R shoulder flexion, abduction, external rotation, and internal rotation.    STATUS:  Partially met, continue   STG2b:  3 weeks:  The patient will be independent with home exercises.     STATUS:  met   TREATMENT: Manual therapy, therapeutic exercise, home exercise instruction, and modalities as needed to include: electrical stimulation, ultrasound, moist heat, and ice.     3.  The patient complains of pain to the R shoulder.   LTG 3: 6 weeks:  The patient will report a pain rating of 2 /10 or better in order to improve sleep quality and tolerance to performance of activities of daily living.    STATUS:  Not met, continue   STG 3a: 3 weeks:  The patient will report a pain rating of 4 /10 or better.      STATUS:  Not met consistently, continue   TREATMENT: Manual therapy, therapeutic exercise, home exercise instruction, and modalities as needed to include: electrical stimulation, ultrasound, moist heat, and ice.      Plan  Therapy options: will be seen for skilled therapy services  Planned modality interventions: traction  Planned therapy interventions: manual therapy, strengthening, therapeutic activities, soft tissue mobilization, functional ROM exercises, home exercise program, spinal/joint mobilization, stretching, joint mobilization, ADL retraining, neuromuscular re-education, body mechanics training, flexibility and postural training  Frequency: 2x week  Duration in weeks: 8  Treatment plan discussed with: patient  Plan details: Manual therapy, therapeutic exercises pt education and HEP.      Progress toward previous goals: Progressing towards        Recommendations: Continue as planned  Timeframe: 2 months  Prognosis to achieve goals: good    PT SIGNATURE: Joyce Douglas, PT     Electronically signed 3/28/2022  DATE TREATMENT INITIATED: 3/28/2022  [place] License: [id number]     Certification Period: 3/28/2022 thru 6/25/2022    Based upon review of the patient's progress and continued therapy plan, it is my medical opinion that Paula Lowe should continue physical therapy treatment at [facility]  [address]  [place] [address] [phone].    Signature: __________________________________  Randall Martinez MD  NPI: [id number]    Timed:  Manual Therapy:    25     mins  17841;  Therapeutic Exercise:    13     mins  63341;      Neuromuscular Nahun:    0    mins  13030;    Therapeutic Activity:     0     mins  42305;     Gait Trainin     mins  04668;     Ultrasound:     0     mins  39934;    Electrical Stimulation:    0     mins  43850 ( );    Untimed:  Electrical Stimulation:    0     mins  89727 ( );  Mechanical Traction:    0     mins  26048;     Timed Treatment:   38   mins   Total Treatment:     44   mins

## 2022-03-30 ENCOUNTER — TREATMENT (OUTPATIENT)
Dept: PHYSICAL THERAPY | Facility: CLINIC | Age: 37
End: 2022-03-30

## 2022-03-30 DIAGNOSIS — M25.511 ACUTE PAIN OF RIGHT SHOULDER: Primary | ICD-10-CM

## 2022-03-30 DIAGNOSIS — S49.91XA INJURY OF RIGHT SHOULDER, INITIAL ENCOUNTER: ICD-10-CM

## 2022-03-30 DIAGNOSIS — M54.12 RADICULOPATHY, CERVICAL: ICD-10-CM

## 2022-03-30 PROCEDURE — 97110 THERAPEUTIC EXERCISES: CPT | Performed by: PHYSICAL THERAPIST

## 2022-03-30 PROCEDURE — 97140 MANUAL THERAPY 1/> REGIONS: CPT | Performed by: PHYSICAL THERAPIST

## 2022-03-30 NOTE — PROGRESS NOTES
Physical Therapy Daily Treatment Note      Patient: Paula Lowe   : 1985  Referring practitioner: Randall Martinez MD  Date of Initial Visit: Type: THERAPY  Noted: 2022  Today's Date: 3/30/2022  Patient seen for 7 sessions           Subjective Questionnaire:       Subjective Evaluation    History of Present Illness    Subjective comment: Pt reports 5/10 R shoulder pain stating she was having pain in it thru the night and had difficulty getting comfortable.  Pt reported she has had numbness that went down the arm and swelled twice since starting PT.       Objective   See Exercise, Manual, and Modality Logs for complete treatment.       Assessment & Plan     Assessment    Assessment details: Pt continues with decreased R shoulder range of motion. Pt tolerated tx well reporting feeling much better after manual work.  Pt reported maybe a 1/10 pain after tx.        Visit Diagnoses:    ICD-10-CM ICD-9-CM   1. Acute pain of right shoulder  M25.511 719.41   2. Injury of right shoulder, initial encounter  S49.91XA 959.2   3. Radiculopathy, cervical  M54.12 723.4       Progress per Plan of Care and Progress strengthening /stabilization /functional activity           Timed:  Manual Therapy:    14     mins  10676;  Therapeutic Exercise:    16     mins  21910;     Neuromuscular Nahun:        mins  77243;    Therapeutic Activity:          mins  30555;     Gait Training:           mins  84805;     Ultrasound:          mins  63073;    Electrical Stimulation:         mins  51198 ( );  Aquatic Therapy          mins  57633    Untimed:  Electrical Stimulation:         mins  34794 ( );  Mechanical Traction:         mins  54406;     Timed Treatment:   30   mins   Total Treatment:     30   mins    Electronically signed    Padma Bruce PTA  Physical Therapist Assistant    [employer] license: [id number]

## 2022-03-31 ENCOUNTER — LAB (OUTPATIENT)
Dept: LAB | Facility: HOSPITAL | Age: 37
End: 2022-03-31

## 2022-03-31 DIAGNOSIS — K21.9 GERD WITHOUT ESOPHAGITIS: ICD-10-CM

## 2022-03-31 LAB — SARS-COV-2 RNA PNL SPEC NAA+PROBE: NOT DETECTED

## 2022-03-31 PROCEDURE — U0004 COV-19 TEST NON-CDC HGH THRU: HCPCS

## 2022-03-31 PROCEDURE — C9803 HOPD COVID-19 SPEC COLLECT: HCPCS

## 2022-04-01 ENCOUNTER — OFFICE VISIT (OUTPATIENT)
Dept: ORTHOPEDIC SURGERY | Facility: CLINIC | Age: 37
End: 2022-04-01

## 2022-04-01 VITALS — HEART RATE: 96 BPM | HEIGHT: 63 IN | OXYGEN SATURATION: 98 % | WEIGHT: 235 LBS | BODY MASS INDEX: 41.64 KG/M2

## 2022-04-01 DIAGNOSIS — M25.511 RIGHT SHOULDER PAIN, UNSPECIFIED CHRONICITY: Primary | ICD-10-CM

## 2022-04-01 DIAGNOSIS — M75.41 IMPINGEMENT SYNDROME OF RIGHT SHOULDER: ICD-10-CM

## 2022-04-01 DIAGNOSIS — M75.51 BURSITIS OF RIGHT SHOULDER: ICD-10-CM

## 2022-04-01 PROCEDURE — 99212 OFFICE O/P EST SF 10 MIN: CPT | Performed by: PHYSICIAN ASSISTANT

## 2022-04-01 NOTE — PRE-PROCEDURE INSTRUCTIONS
Called patient to discuss instructions for NPO after midnight and pre-op medications. Patient aware they can take Synthroid, Claritin, and Protonix.  Patient stated understanding. No other issues or concerns noted currently per patient.  Urine pregs ordered.  Covid swab completed on 3/31/22.
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
## 2022-04-01 NOTE — PROGRESS NOTES
"Chief Complaint  Pain of the Right Shoulder    Subjective     {Problem List  Visit Diagnosis   Encounters  Notes  Medications  Labs  Result Review Imaging  Media :23}     Paula Lowe is a 36 y.o. female  presents to [facility] for   History of Present Illness    ***  Allergies   Allergen Reactions   • Latex Hives        Social History     Socioeconomic History   • Marital status:    Tobacco Use   • Smoking status: Former Smoker     Packs/day: 1.00     Years: 10.00     Pack years: 10.00     Types: Cigarettes     Quit date: 2016     Years since quittin.3   • Smokeless tobacco: Never Used   Vaping Use   • Vaping Use: Never used   Substance and Sexual Activity   • Alcohol use: Not Currently        REVIEW OF SYSTEMS    Constitutional: Denies fevers, chills, weight loss  Cardiovascular: Denies chest pain, shortness of breath  Skin: Denies rashes, acute skin changes  Neurologic: Denies headache, loss of consciousness  MSK: ***      Objective   Vital Signs:   Pulse 96   Ht 160 cm (63\")   Wt 107 kg (235 lb)   SpO2 98%   BMI 41.63 kg/m²     Body mass index is 41.63 kg/m².    Physical Exam    ***    Large Joint Arthrocentesis: R subacromial bursa  Date/Time: 2022 10:19 AM  Consent given by: patient  Site marked: site marked  Timeout: Immediately prior to procedure a time out was called to verify the correct patient, procedure, equipment, support staff and site/side marked as required   Supporting Documentation  Indications: pain   Procedure Details  Location: shoulder - R subacromial bursa  Preparation: Patient was prepped and draped in the usual sterile fashion  Needle gauge: 21 G.  Approach: posterior  Medications administered: 9 mL lidocaine 1 %; 40 mg triamcinolone acetonide 40 MG/ML  Patient tolerance: patient tolerated the procedure well with no immediate complications          Imaging Results (Most Recent)     None           Result Review :{Labs  Result " Review  Imaging  Med Tab  Media  Procedures :23}   The following data was reviewed by: Mary Villa on 04/01/2022:  {Data reviewed (Optional):13471:::1}             Assessment and Plan {CC Problem List  Visit Diagnosis   ROS  Review (Popup)  Health Maintenance  Quality  BestPractice  Medications  SmartSets  SnapShot Encounters  Media :23}   Diagnoses and all orders for this visit:    1. Right shoulder pain, unspecified chronicity (Primary)    2. Impingement syndrome of right shoulder    3. Bursitis of right shoulder        ***    {Ortho Plan:60776}    Follow Up {Instructions Charge Capture  Follow-up Communications :23}  Return in about 6 weeks (around 5/13/2022) for Recheck.  Patient was given instructions and counseling regarding her condition or for health maintenance advice. Please see specific information pulled into the AVS if appropriate.

## 2022-04-05 ENCOUNTER — ANESTHESIA EVENT (OUTPATIENT)
Dept: GASTROENTEROLOGY | Facility: HOSPITAL | Age: 37
End: 2022-04-05

## 2022-04-05 ENCOUNTER — ANESTHESIA (OUTPATIENT)
Dept: GASTROENTEROLOGY | Facility: HOSPITAL | Age: 37
End: 2022-04-05

## 2022-04-05 ENCOUNTER — HOSPITAL ENCOUNTER (OUTPATIENT)
Facility: HOSPITAL | Age: 37
Setting detail: HOSPITAL OUTPATIENT SURGERY
Discharge: HOME | End: 2022-04-05
Attending: SURGERY | Admitting: SURGERY

## 2022-04-05 VITALS
BODY MASS INDEX: 43.49 KG/M2 | TEMPERATURE: 97.3 F | SYSTOLIC BLOOD PRESSURE: 114 MMHG | HEIGHT: 62 IN | DIASTOLIC BLOOD PRESSURE: 74 MMHG | WEIGHT: 236.33 LBS | OXYGEN SATURATION: 97 % | HEART RATE: 79 BPM | RESPIRATION RATE: 12 BRPM

## 2022-04-05 DIAGNOSIS — K21.9 GERD WITHOUT ESOPHAGITIS: ICD-10-CM

## 2022-04-05 LAB — B-HCG UR QL: NEGATIVE

## 2022-04-05 PROCEDURE — 81025 URINE PREGNANCY TEST: CPT | Performed by: ANESTHESIOLOGY

## 2022-04-05 PROCEDURE — 88342 IMHCHEM/IMCYTCHM 1ST ANTB: CPT | Performed by: SURGERY

## 2022-04-05 PROCEDURE — 25010000002 PROPOFOL 10 MG/ML EMULSION: Performed by: NURSE ANESTHETIST, CERTIFIED REGISTERED

## 2022-04-05 PROCEDURE — 88305 TISSUE EXAM BY PATHOLOGIST: CPT | Performed by: SURGERY

## 2022-04-05 RX ORDER — LIDOCAINE HYDROCHLORIDE 20 MG/ML
INJECTION, SOLUTION INFILTRATION; PERINEURAL AS NEEDED
Status: DISCONTINUED | OUTPATIENT
Start: 2022-04-05 | End: 2022-04-05 | Stop reason: SURG

## 2022-04-05 RX ORDER — PROPOFOL 10 MG/ML
VIAL (ML) INTRAVENOUS AS NEEDED
Status: DISCONTINUED | OUTPATIENT
Start: 2022-04-05 | End: 2022-04-05 | Stop reason: SURG

## 2022-04-05 RX ORDER — SODIUM CHLORIDE, SODIUM LACTATE, POTASSIUM CHLORIDE, CALCIUM CHLORIDE 600; 310; 30; 20 MG/100ML; MG/100ML; MG/100ML; MG/100ML
30 INJECTION, SOLUTION INTRAVENOUS CONTINUOUS
Status: CANCELLED | OUTPATIENT
Start: 2022-04-05

## 2022-04-05 RX ORDER — SODIUM CHLORIDE, SODIUM LACTATE, POTASSIUM CHLORIDE, CALCIUM CHLORIDE 600; 310; 30; 20 MG/100ML; MG/100ML; MG/100ML; MG/100ML
INJECTION, SOLUTION INTRAVENOUS CONTINUOUS PRN
Status: DISCONTINUED | OUTPATIENT
Start: 2022-04-05 | End: 2022-04-05 | Stop reason: SURG

## 2022-04-05 RX ADMIN — PROPOFOL 250 MCG/KG/MIN: 10 INJECTION, EMULSION INTRAVENOUS at 11:49

## 2022-04-05 RX ADMIN — LIDOCAINE HYDROCHLORIDE 100 MG: 20 INJECTION, SOLUTION INFILTRATION; PERINEURAL at 11:49

## 2022-04-05 RX ADMIN — PROPOFOL 50 MG: 10 INJECTION, EMULSION INTRAVENOUS at 11:42

## 2022-04-05 RX ADMIN — SODIUM CHLORIDE, POTASSIUM CHLORIDE, SODIUM LACTATE AND CALCIUM CHLORIDE: 600; 310; 30; 20 INJECTION, SOLUTION INTRAVENOUS at 11:41

## 2022-04-05 NOTE — ANESTHESIA POSTPROCEDURE EVALUATION
Patient: Paula Lowe    Procedure Summary     Date: 04/05/22 Room / Location: [facility] ENDOSCOPY 3 / [facility] ENDOSCOPY    Anesthesia Start: 1141 Anesthesia Stop: 1202    Procedure: ESOPHAGOGASTRODUODENOSCOPY (N/A ) Diagnosis:       GERD without esophagitis      (GERD without esophagitis [K21.9])    Surgeons: Indra Boo MD Provider: Zeenat Munguia MD    Anesthesia Type: general ASA Status: 2          Anesthesia Type: general    Vitals  Vitals Value Taken Time   /74 04/05/22 1232   Temp 36.3 °C (97.3 °F) 04/05/22 1201   Pulse 71 04/05/22 1232   Resp 12 04/05/22 1226   SpO2 99 % 04/05/22 1232   Vitals shown include unvalidated device data.        Post Anesthesia Care and Evaluation    Patient location during evaluation: bedside  Patient participation: complete - patient participated  Level of consciousness: awake  Pain management: adequate  Airway patency: patent  Anesthetic complications: No anesthetic complications  PONV Status: none  Cardiovascular status: acceptable and stable  Respiratory status: acceptable  Hydration status: acceptable    Comments: An Anesthesiologist personally participated in the most demanding procedures (including induction and emergence if applicable) in the anesthesia plan, monitored the course of anesthesia administration at frequent intervals and remained physically present and available for immediate diagnosis and treatment of emergencies.

## 2022-04-05 NOTE — ANESTHESIA PREPROCEDURE EVALUATION
Anesthesia Evaluation     Patient summary reviewed and Nursing notes reviewed   NPO Solid Status: > 8 hours  NPO Liquid Status: > 8 hours           Airway   Mallampati: II  TM distance: >3 FB  Neck ROM: full  No difficulty expected  Dental - normal exam     Pulmonary - normal exam   Cardiovascular - normal exam    (+) valvular problems/murmurs,       Neuro/Psych  GI/Hepatic/Renal/Endo    (+)  GERD well controlled,  thyroid problem     Musculoskeletal     Abdominal    Substance History      OB/GYN          Other   arthritis,                      Anesthesia Plan    ASA 2     general   total IV anesthesia  intravenous induction     Anesthetic plan, all risks, benefits, and alternatives have been provided, discussed and informed consent has been obtained with: patient.    Plan discussed with CRNA.        CODE STATUS:

## 2022-04-06 LAB
CYTO UR: NORMAL
LAB AP CASE REPORT: NORMAL
LAB AP CLINICAL INFORMATION: NORMAL
LAB AP SPECIAL STAINS: NORMAL
PATH REPORT.FINAL DX SPEC: NORMAL
PATH REPORT.GROSS SPEC: NORMAL

## 2022-04-11 ENCOUNTER — TREATMENT (OUTPATIENT)
Dept: PHYSICAL THERAPY | Facility: CLINIC | Age: 37
End: 2022-04-11

## 2022-04-11 DIAGNOSIS — M25.511 ACUTE PAIN OF RIGHT SHOULDER: Primary | ICD-10-CM

## 2022-04-11 DIAGNOSIS — S49.91XA INJURY OF RIGHT SHOULDER, INITIAL ENCOUNTER: ICD-10-CM

## 2022-04-11 DIAGNOSIS — M54.12 RADICULOPATHY, CERVICAL: ICD-10-CM

## 2022-04-11 PROCEDURE — 97110 THERAPEUTIC EXERCISES: CPT | Performed by: PHYSICAL THERAPIST

## 2022-04-11 PROCEDURE — 97140 MANUAL THERAPY 1/> REGIONS: CPT | Performed by: PHYSICAL THERAPIST

## 2022-04-11 NOTE — PROGRESS NOTES
Physical Therapy Daily Treatment Note      Patient: Paula Lowe   : 1985  Referring practitioner: Randall Martinez MD  Date of Initial Visit: Type: THERAPY  Noted: 2022  Today's Date: 2022  Patient seen for 8 sessions           Subjective Questionnaire:       Subjective Evaluation    History of Present Illness    Subjective comment: Pt reports shoulder feels good today.  Pt reports she can reach but it is more uncomfortable on R side more than on the L.       Objective   See Exercise, Manual, and Modality Logs for complete treatment.       Assessment & Plan     Assessment    Assessment details: Pt reports RUE numbness has not happened since .  Pt reports that is the last time she woke up with RUE swollen and numb was 3/13/22.  Pt tolerates tx well and reported she is doing well.  Pt reported she saw Charli last week and is to continue PT and see him on May 13, 2022.        Visit Diagnoses:    ICD-10-CM ICD-9-CM   1. Acute pain of right shoulder  M25.511 719.41   2. Injury of right shoulder, initial encounter  S49.91XA 959.2   3. Radiculopathy, cervical  M54.12 723.4       Progress per Plan of Care and Progress strengthening /stabilization /functional activity           Timed:  Manual Therapy:     8    mins  60505;  Therapeutic Exercise:    22     mins  31299;     Neuromuscular Nahun:        mins  27845;    Therapeutic Activity:          mins  58137;     Gait Training:           mins  98447;     Ultrasound:          mins  46387;    Electrical Stimulation:         mins  73033 ( );  Aquatic Therapy          mins  08730    Untimed:  Electrical Stimulation:         mins  74152 ( );  Mechanical Traction:         mins  81376;     Timed Treatment:   30   mins   Total Treatment:     30   mins    Electronically signed    Padma Bruce PTA  Physical Therapist Assistant    [employer] license: [id number]

## 2022-04-12 ENCOUNTER — OFFICE VISIT (OUTPATIENT)
Dept: SURGERY | Facility: CLINIC | Age: 37
End: 2022-04-12

## 2022-04-12 VITALS — HEIGHT: 63 IN | BODY MASS INDEX: 42.49 KG/M2 | WEIGHT: 239.8 LBS | RESPIRATION RATE: 14 BRPM

## 2022-04-12 DIAGNOSIS — K21.9 GASTROESOPHAGEAL REFLUX DISEASE, UNSPECIFIED WHETHER ESOPHAGITIS PRESENT: Primary | ICD-10-CM

## 2022-04-12 PROCEDURE — 99212 OFFICE O/P EST SF 10 MIN: CPT | Performed by: SURGERY

## 2022-04-12 NOTE — PROGRESS NOTES
General Surgery/Colorectal Surgery Note    Patient Name:  Paula Lowe  YOB: 1985  [id number]    Referring Provider: No ref. provider found      Patient Care Team:  Michaela Prince APRN as PCP - General (Nurse Practitioner)    Chief complaint follow-up endoscopy    Subjective .     History of present illness:    Status post EGD 2022 with no mass, ulcer, hiatal hernia.  Pathology with duodenal biopsy negative for celiac, pylorus biopsy with reactive gastropathy, antral biopsy with chronic inactive gastritis, negative for H. pylori, GE junction with reflux esophagitis, mid esophageal biopsy with reflux esophagitis    Started on PPI.    She comes in for follow-up.  She feels much better.  She is pleased with her progress.      History:  Past Medical History:   Diagnosis Date   • Allergies    • Disease of thyroid gland    • GERD (gastroesophageal reflux disease)    • Heart murmur    • Leaky heart valve        Past Surgical History:   Procedure Laterality Date   • ENDOSCOPY N/A 2022    Procedure: ESOPHAGOGASTRODUODENOSCOPY;  Surgeon: Indra Boo MD;  Location: [facility] ENDOSCOPY;  Service: General;  Laterality: N/A;  SAME   • TONSILLECTOMY         Family History   Problem Relation Age of Onset   • Malig Hyperthermia Neg Hx        Social History     Tobacco Use   • Smoking status: Former Smoker     Packs/day: 1.00     Years: 10.00     Pack years: 10.00     Types: Cigarettes     Quit date: 2016     Years since quittin.3   • Smokeless tobacco: Never Used   Vaping Use   • Vaping Use: Never used   Substance Use Topics   • Alcohol use: Not Currently   • Drug use: Never       Review of Systems  All systems were reviewed and negative except for:   Review of Systems   Constitutional: Negative for chills, fever and unexpected weight loss.   HENT: Negative for congestion, nosebleeds and voice change.    Eyes: Negative for blurred vision, double vision and discharge.   Respiratory:  Negative for apnea, chest tightness and shortness of breath.    Cardiovascular: Negative for chest pain and leg swelling.   Gastrointestinal:        See HPI   Endocrine: Negative for cold intolerance and heat intolerance.   Genitourinary: Negative for dysuria, hematuria and urgency.   Musculoskeletal: Negative for back pain, joint swelling and neck pain.   Skin: Negative for color change and dry skin.   Neurological: Negative for dizziness and confusion.   Hematological: Negative for adenopathy.   Psychiatric/Behavioral: Negative for agitation and behavioral problems.     MEDS:  Prior to Admission medications    Medication Sig Start Date End Date Taking? Authorizing Provider   ergocalciferol (ERGOCALCIFEROL) 1.25 MG (34160 UT) capsule ergocalciferol (vitamin D2) 1,250 mcg (50,000 unit) capsule   Yes Robert Aden MD   levothyroxine (SYNTHROID, LEVOTHROID) 25 MCG tablet levothyroxine 25 mcg tablet   Yes Robert Aden MD   loratadine (CLARITIN) 10 MG tablet Claritin 10 mg oral tablet take 1 tablet (10 mg) by oral route once daily   Active   Yes Robert Aden MD   pantoprazole (Protonix) 20 MG EC tablet Take 1 tablet by mouth Daily. 3/15/22 3/15/23 Yes Indra Boo MD   Pediatric Multivitamins-Iron (FLINTSTONES W/IRON PO) Flintstones Gummies chewable tablet   Take by oral route.   Yes Robert Aden MD   Xiidra 5 % ophthalmic solution  1/27/22  Yes Robert Aden MD        Allergies:  Latex    Objective     Vital Signs   Resp:  [14] 14    Physical Exam:     General Appearance:    Alert, cooperative, in no acute distress   Head:    Normocephalic, without obvious abnormality, atraumatic   Eyes:          Conjunctivae and sclerae normal, no icterus,     Ears:    Ears appear intact with no abnormalities noted   Throat:   No oral lesions, no thrush, oral mucosa moist   Neck:   No adenopathy, supple, trachea midline, no thyromegaly   Back:     No kyphosis present, no scoliosis  "present, no skin lesions,      erythema or scars, no tenderness to percussion or                   palpation,   range of motion normal   Lungs:     Clear to auscultation,respirations regular, even and                  unlabored    Heart:    Regular rhythm and normal rate, normal S1 and S2, no            murmur, no gallop, no rub, no click   Chest Wall:    No abnormalities observed   Abdomen:     Normal bowel sounds, no masses, no organomegaly, soft        non-tender, non-distended, no guarding, no rebound                tenderness   Rectal:        Extremities:   Moves all extremities well, no edema, no cyanosis, no             redness   Pulses:   Pulses palpable and equal bilaterally   Skin:   No bleeding, bruising or rash   Lymph nodes:   No palpable adenopathy   Neurologic:   A/o x 4 with no deficits       Results Review:   {Results Review:13095::\"I reviewed the patient's new clinical results.\"    LABS/IMAGING:  Results for orders placed or performed during the hospital encounter of 04/05/22   Pregnancy, Urine - Urine, Clean Catch    Specimen: Urine, Clean Catch   Result Value Ref Range    HCG, Urine QL Negative Negative   Tissue Pathology Exam    Specimen: A: Small Intestine; Tissue    B: Gastric, Antrum; Tissue    C: Gastric, Antrum; Tissue    D: Esophagus; Tissue    E: Esophagus, Mid; Tissue   Result Value Ref Range    Case Report       Surgical Pathology Report                         Case: [id number]                                  Authorizing Provider:  Indra Boo MD  Collected:           04/05/2022 11:51 AM          Ordering Location:     [facility] Received:            04/05/2022 01:09 PM                                 SUITES                                                                       Pathologist:           Zoraida Villa DO                                                       Specimens:   1) - Small Intestine, DUODENUM BX                                        "                            2) - Gastric, Antrum, PYLORIS BX                                                                    3) - Gastric, Antrum, ANTRUM BX                                                                     4) - Esophagus, GEJUNCTION BX                                                                       5) - Esophagus, Mid, MID ESOPH BX                                                          Clinical Information      Final Diagnosis       1. Duodenum, biopsy:    - No significant pathologic change    - Preserved villous architecture and no increase in intraepithelial lymphocytes      2. Stomach, pylorus, biopsy:    - Gastric mucosa with reactive gastropathy    - Negative for Helicobacter pylori on routine H&E stain    - Negative for intestinal metaplasia, dysplasia and malignancy      3. Stomach, antrum, biopsy:    - Gastric mucosa with chronic inactive gastritis    - Negative for Helicobacter pylori on immunohistochemical stain    - Negative for intestinal metaplasia, dysplasia and malignancy        4. Gastroesophageal junction, biopsy:    - Squamocolumnar mucosa with increased eosinophils and changes consistent with reflux esophagitis    - Negative for intestinal metaplasia, dysplasia and malignancy      5. Mid esophagus, biopsy:    - Squamous mucosa with few eosinophils, suggestive of reflux esophagitis    - Negative for eosinophilic esophagitis          Gross Description       1. Small Intestine.  Duodenum biopsy: Received in formalin is a 0.5 cm irregular fragment of pink soft tissue.  All 1A.      2. Gastric, Antrum.  Pylorus biopsy: Received in formalin is a 0.3 cm reddish-tan soft tissue fragment.  All 2A.      3. Gastric, Antrum.  Antrum biopsy: Received in formalin is a 0.5 cm light tan soft tissue fragment.  All 3A.      4. Esophagus.  GEJUNCTION BX: Received in formalin is a 0.5 cm soft tissue fragment.  All 4A.      5. Esophagus, Mid.  Mid esoph BX: Received in formalin is a 0.5 cm  friable soft tissue fragment.  All 5A.  CRE          Special Stains       An immunoperoxidase stain for Helicobacter pylori was performed to aid in its detection since it was not readily identified on H&E stain.  All immunohistochemical/cytochemical stains (IHC) are performed on separate slides per different antibody unless otherwise specified in the documentation that a cocktail (multiple stain) was performed.  Controls are appropriate.        Microscopic Description          Result Review :     Assessment/Plan     Status post EGD 4/5/2022 with no mass, ulcer, hiatal hernia.  Pathology with duodenal biopsy negative for celiac, pylorus biopsy with reactive gastropathy, antral biopsy with chronic inactive gastritis, negative for H. pylori, GE junction with reflux esophagitis, mid esophageal biopsy with reflux esophagitis  GERD    Continue PPI.  If symptoms worsen follow-up me to discuss reflux surgery.  All questions answered.  She agrees with the plan.  Thank for the consult.           This document has been electronically signed by Indra Boo MD  April 12, 2022 12:51 EDT

## 2022-04-13 ENCOUNTER — TREATMENT (OUTPATIENT)
Dept: PHYSICAL THERAPY | Facility: CLINIC | Age: 37
End: 2022-04-13

## 2022-04-13 DIAGNOSIS — S49.91XA INJURY OF RIGHT SHOULDER, INITIAL ENCOUNTER: ICD-10-CM

## 2022-04-13 DIAGNOSIS — M25.511 ACUTE PAIN OF RIGHT SHOULDER: Primary | ICD-10-CM

## 2022-04-13 DIAGNOSIS — M54.12 RADICULOPATHY, CERVICAL: ICD-10-CM

## 2022-04-13 PROCEDURE — 97110 THERAPEUTIC EXERCISES: CPT | Performed by: PHYSICAL THERAPIST

## 2022-04-13 PROCEDURE — 97140 MANUAL THERAPY 1/> REGIONS: CPT | Performed by: PHYSICAL THERAPIST

## 2022-04-13 NOTE — PROGRESS NOTES
Physical Therapy Daily Progress Note    VISIT#: 9    Subjective   Paula Lowe reports 1/10. Pt reports she continues to have no numbness and tingling down her arm.      Objective     See Exercise, Manual, and Modality Logs for complete treatment.     Assessment/Plan     Progressed patient's strengthening exercises and patient reported increase in pain with bent over T's at counter in AC joint, therefore unable to perform. Pt is very point tender in AC joint today. Pt able to tolerate all other exercises with no reports of increase in pain but did report muscle soreness. Will continue to progress patient as able.        Progress per Plan of Care and Progress strengthening /stabilization /functional activity            Timed:                 Manual Therapy:    10     mins  64060;     Therapeutic Exercise:    18     mins  87927;     Neuromuscular Nahun:    0    mins  66862;    Therapeutic Activity:     0     mins  98549;     Gait Trainin     mins  92177;     Ultrasound:     0     mins  36687;    Ionto                               0    mins   14995  Self pay                         0     mins [email or web address]    Un-Timed:  Electrical Stimulation:    0     mins  82709 ( )  Canalith Repos    0     mins 25514  Dry Needling     0     mins self-pay  Traction     0     mins 29019    Timed Treatment:   28   mins   Total Treatment:     28   mins    Ginna Alonzo PT  Physical Therapist    PT SIGNATURE: Electronically signed by Ginna Alonzo PT  [place] LICENSE: [id number]

## 2022-04-20 ENCOUNTER — TREATMENT (OUTPATIENT)
Dept: PHYSICAL THERAPY | Facility: CLINIC | Age: 37
End: 2022-04-20

## 2022-04-20 DIAGNOSIS — M54.12 RADICULOPATHY, CERVICAL: ICD-10-CM

## 2022-04-20 DIAGNOSIS — M25.511 ACUTE PAIN OF RIGHT SHOULDER: Primary | ICD-10-CM

## 2022-04-20 DIAGNOSIS — S49.91XA INJURY OF RIGHT SHOULDER, INITIAL ENCOUNTER: ICD-10-CM

## 2022-04-20 PROCEDURE — 97530 THERAPEUTIC ACTIVITIES: CPT | Performed by: PHYSICAL THERAPIST

## 2022-04-20 PROCEDURE — 97110 THERAPEUTIC EXERCISES: CPT | Performed by: PHYSICAL THERAPIST

## 2022-04-20 PROCEDURE — 97140 MANUAL THERAPY 1/> REGIONS: CPT | Performed by: PHYSICAL THERAPIST

## 2022-04-20 NOTE — PROGRESS NOTES
Physical Therapy Daily Treatment Note      Patient: Paula Lowe   : 1985  Referring practitioner: Randall Martinez MD  Date of Initial Visit: Type: THERAPY  Noted: 2022  Today's Date: 2022  Patient seen for 10 sessions           Subjective Questionnaire:       Subjective Evaluation    History of Present Illness    Subjective comment: Pt reports her arm and the tingling are good.  Pt reports she was looking back and remembers she couldn't even lift covers up and now she can do that.       Objective   See Exercise, Manual, and Modality Logs for complete treatment.       Assessment & Plan     Assessment    Assessment details: Pt reported burning at R deltoid after therapeutic exercise.  Pt tolerated tx well stating she felt good after tx.  Pt is I with HEP and reports she does it when she can find a door and knows stretches.        Visit Diagnoses:    ICD-10-CM ICD-9-CM   1. Acute pain of right shoulder  M25.511 719.41   2. Injury of right shoulder, initial encounter  S49.91XA 959.2   3. Radiculopathy, cervical  M54.12 723.4       Progress per Plan of Care and Progress strengthening /stabilization /functional activity           Timed:  Manual Therapy:    10     mins  74267;  Therapeutic Exercise:    10     mins  10058;     Neuromuscular Nahun:        mins  87018;    Therapeutic Activity:     8     mins  04718;     Gait Training:           mins  81539;     Ultrasound:          mins  07421;    Electrical Stimulation:         mins  61615 ( );  Aquatic Therapy          mins  39064    Untimed:  Electrical Stimulation:         mins  48067 ( );  Mechanical Traction:         mins  71288;     Timed Treatment:   28   mins   Total Treatment:     28   mins    Electronically signed    Padma Bruce PTA  Physical Therapist Assistant    [employer] license: [id number]

## 2022-04-28 ENCOUNTER — TREATMENT (OUTPATIENT)
Dept: PHYSICAL THERAPY | Facility: CLINIC | Age: 37
End: 2022-04-28

## 2022-04-28 DIAGNOSIS — S49.91XA INJURY OF RIGHT SHOULDER, INITIAL ENCOUNTER: ICD-10-CM

## 2022-04-28 DIAGNOSIS — M25.511 ACUTE PAIN OF RIGHT SHOULDER: Primary | ICD-10-CM

## 2022-04-28 DIAGNOSIS — M54.12 RADICULOPATHY, CERVICAL: ICD-10-CM

## 2022-04-28 PROCEDURE — 97110 THERAPEUTIC EXERCISES: CPT | Performed by: PHYSICAL THERAPIST

## 2022-04-28 NOTE — PROGRESS NOTES
Progress Assessment        Patient: Paula Lowe   : 1985  Diagnosis/ICD-10 Code:  Acute pain of right shoulder [M25.511]  Referring practitioner: Randall Martinez MD  Date of Initial Visit: Type: THERAPY  Noted: 2022  Today's Date: 2022  Patient seen for 11 sessions      Subjective:   Paula Lowe reports: 1/10  Subjective Questionnaire: UEFI: 18.75%  Clinical Progress: improved  Home Program Compliance: Yes  Treatment has included: therapeutic exercise, neuromuscular re-education, manual therapy and therapeutic activity    Subjective Pt reports she is doing so much better since starting therapy and reports she can do so much more at home.    Objective     Right Shoulder      Planes of Motion   Flexion: 5  Extension: 5   Abduction: 5  External rotation at 0°: 4+   Internal rotation at 0°: 5       Left Shoulder   Normal active range of motion    Right Shoulder   Flexion: 170 degrees   Abduction: 170 degrees   External rotation 0°: 70 degrees   Internal rotation BTB: T9    Assessment/Plan     Pt has made excellent progress with improving strength and R shoulder ROM. Pt is discharged at this time to continue HEP at home to continue to improve and maintain strength and ROM. Pt feels confident with continuing HEP at home.     Plan Goals: SHOULDER  PROBLEMS:      1. The patient has limited ROM of the R shoulder.                  LTG 1: 6 weeks:  The patient will demonstrate 170 degrees of R shoulder flexion, 170 degrees of shoulder abduction, 70 degrees of shoulder external rotation, and 70 degrees of shoulder internal rotation to allow the patient to reach into upper kitchen cabinets and manipulate clothing behind the back with greater ease.                          STATUS:  MET              STG 1a: 3 weeks:  The patient will demonstrate 150 degrees of R shoulder flexion, 150 degrees of shoulder abduction, 60 degrees of shoulder external rotation, and 60 degrees of shoulder internal rotation.                           STATUS:  MET              TREATMENT: Manual therapy, therapeutic exercise, home exercise instruction, and modalities as needed to include: electrical stimulation, ultrasound, moist heat, and ice.    2. The patient has limited strength of the R shoulder.              LTG 2: 6 weeks:  The patient will demonstrate 5 /5 strength for R shoulder flexion, abduction, external rotation, and internal rotation in order to demonstrate improved shoulder stability.                          STATUS:  NOT MET              STG 2a: 3 weeks:  The patient will demonstrate 4 /5 strength for R shoulder flexion, abduction, external rotation, and internal rotation.                          STATUS: MET              STG2b:  3 weeks:  The patient will be independent with home exercises.                           STATUS:  MET              TREATMENT: Manual therapy, therapeutic exercise, home exercise instruction, and modalities as needed to include: electrical stimulation, ultrasound, moist heat, and ice.                3. The patient complains of pain to the R shoulder.              LTG 3: 6 weeks:  The patient will report a pain rating of 2 /10 or better in order to improve sleep quality and tolerance to performance of activities of daily living.                          STATUS:  MET              STG 3a: 3 weeks:  The patient will report a pain rating of 4 /10 or better.                            STATUS:  MET              TREATMENT: Manual therapy, therapeutic exercise, home exercise instruction, and modalities as needed to include: electrical stimulation, ultrasound, moist heat, and ice.    Progress toward previous goals: Partially Met    See Exercise, Manual, and Modality Logs for complete treatment.         Recommendations: Discharge     PT SIGNATURE: Electronically signed by Ginna Alonzo PT  [place] LICENSE: [id number]    Based upon review of the patient's progress and continued therapy plan, it is my medical  opinion that Paula Lowe should continue physical therapy treatment at [facility]  [address] [place] [address] [phone].      Timed:                 Manual Therapy:    0     mins  63030;     Therapeutic Exercise:    15     mins  85410;     Neuromuscular Nahun:    0    mins  14486;    Therapeutic Activity:     0     mins  42640;     Gait Trainin     mins  99093;     Ultrasound:     0     mins  99309;    Ionto                               0    mins   59821  Self pay                         0     mins [email or web address]    Un-Timed:  Electrical Stimulation:    0     mins  31562 ( )  Canalith Repos    0     mins 54997  Dry Needling     0     mins self-pay  Traction     0     mins 75957    Timed Treatment:   15   mins   Total Treatment:     20   mins      I certify that the therapy services are furnished while this patient is under my care.  The services outlined above are required by this patient, and will be reviewed every 90 days.

## 2022-05-13 ENCOUNTER — OFFICE VISIT (OUTPATIENT)
Dept: ORTHOPEDIC SURGERY | Facility: CLINIC | Age: 37
End: 2022-05-13

## 2022-05-13 VITALS — WEIGHT: 240.8 LBS | HEIGHT: 63 IN | OXYGEN SATURATION: 96 % | BODY MASS INDEX: 42.66 KG/M2 | HEART RATE: 96 BPM

## 2022-05-13 DIAGNOSIS — M25.511 RIGHT SHOULDER PAIN, UNSPECIFIED CHRONICITY: Primary | ICD-10-CM

## 2022-05-13 DIAGNOSIS — M75.51 BURSITIS OF RIGHT SHOULDER: ICD-10-CM

## 2022-05-13 DIAGNOSIS — M75.41 IMPINGEMENT SYNDROME OF RIGHT SHOULDER: ICD-10-CM

## 2022-05-13 PROCEDURE — 99213 OFFICE O/P EST LOW 20 MIN: CPT | Performed by: PHYSICIAN ASSISTANT

## 2022-05-13 NOTE — PROGRESS NOTES
"Chief Complaint  Pain and Follow-up of the Right Shoulder    Subjective          Paula Lowe is a 37 y.o. female  presents to [facility] for   History of Present Illness      Patient presents for follow-up evaluation of right shoulder pain right shoulder impingement/bursitis.  Patient was last seen on 2022 she was doing well at that visit she was going to request an injection at that visit but she had a surgery that was coming up so we held off on the injection.  Patient states her surgery went well.  Patient states that she has been doing well and states that she has \"made a lot of progress \"regarding her shoulder and pain and range of motion.  She states she was released from physical therapy and from her chiropractor and is now working on home exercises.  She states her range of motion is better she still gets pain occasionally but is much better and improved compared to previous occasions.  No new complaints today.      Allergies   Allergen Reactions   • Latex Hives        Social History     Socioeconomic History   • Marital status:    Tobacco Use   • Smoking status: Former Smoker     Packs/day: 1.00     Years: 10.00     Pack years: 10.00     Types: Cigarettes     Quit date: 2016     Years since quittin.4   • Smokeless tobacco: Never Used   Vaping Use   • Vaping Use: Never used   Substance and Sexual Activity   • Alcohol use: Not Currently   • Drug use: Never   • Sexual activity: Defer        REVIEW OF SYSTEMS    Constitutional: Denies fevers, chills, weight loss  Cardiovascular: Denies chest pain, shortness of breath  Skin: Denies rashes, acute skin changes  Neurologic: Denies headache, loss of consciousness  MSK: Right shoulder pain      Objective   Vital Signs:   Pulse 96   Ht 160 cm (63\")   Wt 109 kg (240 lb 12.8 oz)   SpO2 96%   BMI 42.66 kg/m²     Body mass index is 42.66 kg/m².    Physical Exam    Right shoulder: Nontender to palpation, skin is " intact, no erythema, no ecchymosis, no swelling, active forward elevation 170, active abduction 130, external rotation with abduction 85, internal rotation 65, internal rotation to T12, 5 out of 5 strength, no pain with resisted range of motion.    Procedures    Imaging Results (Most Recent)     None           Result Review :   The following data was reviewed by: CORBY Silva on 05/13/2022:               Assessment and Plan    Diagnoses and all orders for this visit:    1. Right shoulder pain, unspecified chronicity (Primary)    2. Impingement syndrome of right shoulder    3. Bursitis of right shoulder        Discussed diagnosis and treatment options with the patient she was advised that she could follow-up at anytime for future injections or we can restart therapy at any time, for now she would like to follow-up as needed.    Call or return if worsening symptoms.    Follow Up   Return if symptoms worsen or fail to improve.  Patient was given instructions and counseling regarding her condition or for health maintenance advice. Please see specific information pulled into the AVS if appropriate.

## 2022-07-25 RX ORDER — PANTOPRAZOLE SODIUM 20 MG/1
TABLET, DELAYED RELEASE ORAL
Qty: 30 TABLET | Refills: 1 | Status: SHIPPED | OUTPATIENT
Start: 2022-07-25

## 2022-12-14 ENCOUNTER — OFFICE VISIT (OUTPATIENT)
Dept: OTOLARYNGOLOGY | Facility: CLINIC | Age: 37
End: 2022-12-14

## 2022-12-14 VITALS — HEIGHT: 63 IN | TEMPERATURE: 98.9 F | BODY MASS INDEX: 42.66 KG/M2 | WEIGHT: 240.8 LBS

## 2022-12-14 DIAGNOSIS — J31.0 CHRONIC RHINITIS: ICD-10-CM

## 2022-12-14 DIAGNOSIS — R09.89 CHRONIC THROAT CLEARING: Primary | ICD-10-CM

## 2022-12-14 PROCEDURE — 99214 OFFICE O/P EST MOD 30 MIN: CPT | Performed by: OTOLARYNGOLOGY

## 2022-12-14 PROCEDURE — 31575 DIAGNOSTIC LARYNGOSCOPY: CPT | Performed by: OTOLARYNGOLOGY

## 2022-12-14 RX ORDER — FLUTICASONE PROPIONATE 50 MCG
2 SPRAY, SUSPENSION (ML) NASAL DAILY
Qty: 16 G | Refills: 11 | Status: SHIPPED | OUTPATIENT
Start: 2022-12-14

## 2022-12-14 NOTE — PROGRESS NOTES
Patient Name: Paula Lowe   Visit Date: 12/14/2022   Patient ID: [id number]  Provider: Luís Martínez MD    Sex: female  Location: [facility]   YOB: 1985  Location Address: [address] [place] [address], [address] [place] [address],?[place]?[id number]    Primary Care Provider Michaela Prince APRN  Location Phone: [phone]    Referring Provider: No ref. provider found        Chief Complaint  Sinus issues/feels like food gets stuck in throat at times    History of Present Illness  Paula Lowe is a 37 y.o. female who returns today for evaluation of her sinuses.  She was originally seen on 4/19/2019 at which time she reported frequent left greater than right facial and ear pressure associated with intermittent nasal congestion, sore throat, clear rhinorrhea, and cough.    She was last seen on 8/5/2020 after developing severe bilateral facial and ear pressure which was associated with frequent throat clearing and occasional cough.    She also mentioned photophobia and phonophobia but denies any nausea or vomiting.    She mentioned a history of clenching her teeth.  She has not undergone any imaging.  She declined nasal endoscopy at the time and a CT scan was ordered for further evaluation.  CT scan on 8/5/2020 revealed clear sinuses bilaterally.    She tells me that since her last appointment she has experienced frequent sore throats and sensation of post nasal drainage.  This has caused frequent throat clearing which is quite bothersome.  She takes loratadine and pantoprazole as needed.  She is not currently using any nasal sprays or rinses.  She has never undergone any allergy testing.  She did undergo an EGD on 4/5/2022 which was unremarkable, however, her biopsies were consistent with reflux esophagitis. CT scan of the face with contrast at [facility] on 10/21/2022 was unremarkable.      Past Medical History:   Diagnosis Date   • Allergies    • Disease of thyroid gland    • GERD  "(gastroesophageal reflux disease)    • Heart murmur    • Leaky heart valve        Past Surgical History:   Procedure Laterality Date   • ENDOSCOPY N/A 2022    Procedure: ESOPHAGOGASTRODUODENOSCOPY;  Surgeon: Indra Boo MD;  Location: [facility] ENDOSCOPY;  Service: General;  Laterality: N/A;  SAME   • TONSILLECTOMY           Current Outpatient Medications:   •  ergocalciferol (ERGOCALCIFEROL) 1.25 MG (53644 UT) capsule, ergocalciferol (vitamin D2) 1,250 mcg (50,000 unit) capsule, Disp: , Rfl:   •  levothyroxine (SYNTHROID, LEVOTHROID) 25 MCG tablet, levothyroxine 25 mcg tablet, Disp: , Rfl:   •  loratadine (CLARITIN) 10 MG tablet, Take 10 mg by mouth Daily As Needed., Disp: , Rfl:   •  pantoprazole (PROTONIX) 20 MG EC tablet, TAKE ONE TABLET BY MOUTH DAILY (Patient taking differently: 20 mg Daily As Needed.), Disp: 30 tablet, Rfl: 1  •  fluticasone (FLONASE) 50 MCG/ACT nasal spray, 2 sprays into the nostril(s) as directed by provider Daily., Disp: 16 g, Rfl: 11  •  Pediatric Multivitamins-Iron (FLINTSTONES W/IRON PO), Flintstones Gummies chewable tablet  Take by oral route., Disp: , Rfl:   •  Xiidra 5 % ophthalmic solution, , Disp: , Rfl:      Allergies   Allergen Reactions   • Latex Hives       Social History     Tobacco Use   • Smoking status: Former     Packs/day: 1.00     Years: 10.00     Pack years: 10.00     Types: Cigarettes     Quit date: 2016     Years since quittin.0   • Smokeless tobacco: Never   Vaping Use   • Vaping Use: Never used   Substance Use Topics   • Alcohol use: Not Currently   • Drug use: Never        Objective     Vital Signs:   Temp 98.9 °F (37.2 °C) (Temporal)   Ht 160 cm (63\")   Wt 109 kg (240 lb 12.8 oz)   BMI 42.66 kg/m²       Physical Exam    General: Well developed, well nourished patient of stated age in no acute distress. Voice is strong and clear.   Head: Normocephalic and atraumatic.  Face: No lesions.  Bilateral parotid and submandibular glands are " unremarkable.  Stensen's and Warthin's ducts are productive of clear saliva bilaterally.  House-Brackmann I/VI     bilaterally.   muscles and temporomandibular joint nontender to palpation.  No TMJ crepitus.  Eyes: PERRLA, sclerae anicteric, no conjunctival injection. Extraocular movements are intact and full. No nystagmus.   Ears: Auricles are normal in appearance. Bilateral external auditory canals are unremarkable. Bilateral tympanic membranes are clear and without effusion. Hearing normal to conversational voice.   Nose: External nose is normal in appearance. Bilateral nares are patent with normal appearing mucosa. Septum midline. Turbinates are unremarkable. No lesions.   Oral Cavity: Lips are normal in appearance. Oral mucosa is unremarkable. Gingiva is unremarkable. Normal dentition for age. Tongue is unremarkable with good movement. Hard palate is unremarkable.   Oropharynx: Soft palate is unremarkable with full movement. Uvula is unremarkable. Bilateral tonsils are surgically absent. Posterior oropharynx is unremarkable.    Larynx and hypopharynx: Deferred secondary to gag reflex.  Neck: Supple.  No mass.  Nontender to palpation.  Trachea midline. Thyroid normal size and without nodules to palpation.   Lymphatic: No lymphadenopathy upon palpation.   Psychiatric: Appropriate affect, cooperative   Neurologic: Oriented x 3, strength symmetric in all extremities, Cranial Nerves II-XII are grossly intact to confrontation   Skin: Warm and dry. No rashes.    Procedures   Procedure: Flexible fiberoptic nasolaryngoscopy.    Indications: Persistent throat clearing in a former smoker.    Summary: The patient's bilateral nares were decongested and anesthetized with Afrin and lidocaine sprays respectively. After giving the medications ample time to take effect flexible fiberoptic scope was inserted in the patient's right naris revealing a normal-appearing nasal cavity and nasopharynx. The base of tongue,  vallecula, epiglottis, aryepiglottic folds, and arytenoid cartilages are all normal-appearing aside from some edema and erythema of the arytenoids and posterior commissure.  There is cobblestoning at the posterior pharynx.  The piriform sinuses were normal in appearance. The bilateral false and true vocal folds are normal in appearance and adducted and abducted normally. The subglottis was widely patent. The patient tolerated the procedure well.        Result Review :               Assessment and Plan    Diagnoses and all orders for this visit:    1. Chronic throat clearing (Primary)  -     Ambulatory Referral to Allergy  -     fluticasone (FLONASE) 50 MCG/ACT nasal spray; 2 sprays into the nostril(s) as directed by provider Daily.  Dispense: 16 g; Refill: 11    2. Chronic rhinitis  -     Ambulatory Referral to Allergy  -     fluticasone (FLONASE) 50 MCG/ACT nasal spray; 2 sprays into the nostril(s) as directed by provider Daily.  Dispense: 16 g; Refill: 11    Impressions and findings were discussed at great length.  Currently, she is seen for evaluation of chronic throat clearing and a sensation of postnasal drainage.  Examination today is concerning for irritation secondary to reflux versus allergies.  We discussed the pathophysiology and natural history of this condition.  Options for management were discussed and we will add fluticasone to her nasal regimen and discussed saline irrigations.  She will also be referred to an allergist and will try taking her pantoprazole consistently over the next 3 months.  We discussed conservative measures for reflux management.  She was given ample time to ask questions, all of which were answered to her satisfaction.        Follow Up   Return if symptoms worsen or fail to improve.  Patient was given instructions and counseling regarding her condition or for health maintenance advice. Please see specific information pulled into the AVS if appropriate.

## 2023-03-05 ENCOUNTER — HOSPITAL ENCOUNTER (EMERGENCY)
Facility: HOSPITAL | Age: 38
Discharge: HOME | End: 2023-03-05
Attending: EMERGENCY MEDICINE | Admitting: EMERGENCY MEDICINE
Payer: COMMERCIAL

## 2023-03-05 ENCOUNTER — APPOINTMENT (OUTPATIENT)
Dept: CT IMAGING | Facility: HOSPITAL | Age: 38
End: 2023-03-05
Payer: COMMERCIAL

## 2023-03-05 VITALS
OXYGEN SATURATION: 99 % | SYSTOLIC BLOOD PRESSURE: 128 MMHG | HEART RATE: 59 BPM | RESPIRATION RATE: 20 BRPM | BODY MASS INDEX: 43.25 KG/M2 | WEIGHT: 235.01 LBS | DIASTOLIC BLOOD PRESSURE: 84 MMHG | HEIGHT: 62 IN

## 2023-03-05 DIAGNOSIS — N20.1 URETEROLITHIASIS: Primary | ICD-10-CM

## 2023-03-05 LAB
ALBUMIN SERPL-MCNC: 4.6 G/DL (ref 3.5–5.2)
ALBUMIN/GLOB SERPL: 1.5 G/DL
ALP SERPL-CCNC: 96 U/L (ref 39–117)
ALT SERPL W P-5'-P-CCNC: 19 U/L (ref 1–33)
ANION GAP SERPL CALCULATED.3IONS-SCNC: 15.1 MMOL/L (ref 5–15)
AST SERPL-CCNC: 22 U/L (ref 1–32)
BACTERIA UR QL AUTO: ABNORMAL /HPF
BASOPHILS # BLD AUTO: 0.04 10*3/MM3 (ref 0–0.2)
BASOPHILS NFR BLD AUTO: 0.4 % (ref 0–1.5)
BILIRUB SERPL-MCNC: 0.6 MG/DL (ref 0–1.2)
BILIRUB UR QL STRIP: NEGATIVE
BUN SERPL-MCNC: 9 MG/DL (ref 6–20)
BUN/CREAT SERPL: 10.3 (ref 7–25)
CALCIUM SPEC-SCNC: 9.6 MG/DL (ref 8.6–10.5)
CHLORIDE SERPL-SCNC: 102 MMOL/L (ref 98–107)
CLARITY UR: CLEAR
CO2 SERPL-SCNC: 20.9 MMOL/L (ref 22–29)
COLOR UR: YELLOW
CREAT SERPL-MCNC: 0.87 MG/DL (ref 0.57–1)
DEPRECATED RDW RBC AUTO: 41 FL (ref 37–54)
EGFRCR SERPLBLD CKD-EPI 2021: 88.7 ML/MIN/1.73
EOSINOPHIL # BLD AUTO: 0.29 10*3/MM3 (ref 0–0.4)
EOSINOPHIL NFR BLD AUTO: 3.1 % (ref 0.3–6.2)
ERYTHROCYTE [DISTWIDTH] IN BLOOD BY AUTOMATED COUNT: 13.4 % (ref 12.3–15.4)
GLOBULIN UR ELPH-MCNC: 3 GM/DL
GLUCOSE SERPL-MCNC: 104 MG/DL (ref 65–99)
GLUCOSE UR STRIP-MCNC: NEGATIVE MG/DL
HCG INTACT+B SERPL-ACNC: <0.5 MIU/ML
HCT VFR BLD AUTO: 39.8 % (ref 34–46.6)
HGB BLD-MCNC: 13 G/DL (ref 12–15.9)
HGB UR QL STRIP.AUTO: ABNORMAL
HOLD SPECIMEN: NORMAL
HOLD SPECIMEN: NORMAL
HYALINE CASTS UR QL AUTO: ABNORMAL /LPF
IMM GRANULOCYTES # BLD AUTO: 0.02 10*3/MM3 (ref 0–0.05)
IMM GRANULOCYTES NFR BLD AUTO: 0.2 % (ref 0–0.5)
KETONES UR QL STRIP: NEGATIVE
LEUKOCYTE ESTERASE UR QL STRIP.AUTO: NEGATIVE
LIPASE SERPL-CCNC: 33 U/L (ref 13–60)
LYMPHOCYTES # BLD AUTO: 1.76 10*3/MM3 (ref 0.7–3.1)
LYMPHOCYTES NFR BLD AUTO: 19.1 % (ref 19.6–45.3)
MCH RBC QN AUTO: 27.3 PG (ref 26.6–33)
MCHC RBC AUTO-ENTMCNC: 32.7 G/DL (ref 31.5–35.7)
MCV RBC AUTO: 83.6 FL (ref 79–97)
MONOCYTES # BLD AUTO: 0.63 10*3/MM3 (ref 0.1–0.9)
MONOCYTES NFR BLD AUTO: 6.8 % (ref 5–12)
NEUTROPHILS NFR BLD AUTO: 6.49 10*3/MM3 (ref 1.7–7)
NEUTROPHILS NFR BLD AUTO: 70.4 % (ref 42.7–76)
NITRITE UR QL STRIP: NEGATIVE
NRBC BLD AUTO-RTO: 0 /100 WBC (ref 0–0.2)
PH UR STRIP.AUTO: 6 [PH] (ref 5–8)
PLATELET # BLD AUTO: 409 10*3/MM3 (ref 140–450)
PMV BLD AUTO: 9.3 FL (ref 6–12)
POTASSIUM SERPL-SCNC: 4.3 MMOL/L (ref 3.5–5.2)
PROT SERPL-MCNC: 7.6 G/DL (ref 6–8.5)
PROT UR QL STRIP: NEGATIVE
RBC # BLD AUTO: 4.76 10*6/MM3 (ref 3.77–5.28)
RBC # UR STRIP: ABNORMAL /HPF
REF LAB TEST METHOD: ABNORMAL
SODIUM SERPL-SCNC: 138 MMOL/L (ref 136–145)
SP GR UR STRIP: <=1.005 (ref 1–1.03)
SQUAMOUS #/AREA URNS HPF: ABNORMAL /HPF
UROBILINOGEN UR QL STRIP: ABNORMAL
WBC # UR STRIP: ABNORMAL /HPF
WBC NRBC COR # BLD: 9.23 10*3/MM3 (ref 3.4–10.8)
WHOLE BLOOD HOLD COAG: NORMAL
WHOLE BLOOD HOLD SPECIMEN: NORMAL

## 2023-03-05 PROCEDURE — 99283 EMERGENCY DEPT VISIT LOW MDM: CPT

## 2023-03-05 PROCEDURE — 96375 TX/PRO/DX INJ NEW DRUG ADDON: CPT

## 2023-03-05 PROCEDURE — 81001 URINALYSIS AUTO W/SCOPE: CPT

## 2023-03-05 PROCEDURE — 80053 COMPREHEN METABOLIC PANEL: CPT

## 2023-03-05 PROCEDURE — 96374 THER/PROPH/DIAG INJ IV PUSH: CPT

## 2023-03-05 PROCEDURE — 25010000002 ONDANSETRON PER 1 MG: Performed by: EMERGENCY MEDICINE

## 2023-03-05 PROCEDURE — 25010000002 HYDROMORPHONE 1 MG/ML SOLUTION: Performed by: EMERGENCY MEDICINE

## 2023-03-05 PROCEDURE — 83690 ASSAY OF LIPASE: CPT

## 2023-03-05 PROCEDURE — 84702 CHORIONIC GONADOTROPIN TEST: CPT

## 2023-03-05 PROCEDURE — 25010000002 KETOROLAC TROMETHAMINE PER 15 MG: Performed by: EMERGENCY MEDICINE

## 2023-03-05 PROCEDURE — 74176 CT ABD & PELVIS W/O CONTRAST: CPT

## 2023-03-05 PROCEDURE — 85025 COMPLETE CBC W/AUTO DIFF WBC: CPT

## 2023-03-05 RX ORDER — SODIUM CHLORIDE 0.9 % (FLUSH) 0.9 %
10 SYRINGE (ML) INJECTION AS NEEDED
Status: DISCONTINUED | OUTPATIENT
Start: 2023-03-05 | End: 2023-03-05 | Stop reason: HOSPADM

## 2023-03-05 RX ORDER — HYDROCODONE BITARTRATE AND ACETAMINOPHEN 5; 325 MG/1; MG/1
1 TABLET ORAL EVERY 6 HOURS PRN
Qty: 12 TABLET | Refills: 0 | Status: SHIPPED | OUTPATIENT
Start: 2023-03-05

## 2023-03-05 RX ORDER — KETOROLAC TROMETHAMINE 10 MG/1
10 TABLET, FILM COATED ORAL EVERY 6 HOURS PRN
Qty: 15 TABLET | Refills: 0 | Status: SHIPPED | OUTPATIENT
Start: 2023-03-05

## 2023-03-05 RX ORDER — HYDROCODONE BITARTRATE AND ACETAMINOPHEN 7.5; 325 MG/1; MG/1
1 TABLET ORAL EVERY 6 HOURS PRN
Qty: 12 TABLET | Refills: 0 | Status: SHIPPED | OUTPATIENT
Start: 2023-03-05

## 2023-03-05 RX ORDER — ONDANSETRON 4 MG/1
4 TABLET, ORALLY DISINTEGRATING ORAL EVERY 8 HOURS PRN
Qty: 12 TABLET | Refills: 0 | Status: SHIPPED | OUTPATIENT
Start: 2023-03-05

## 2023-03-05 RX ORDER — KETOROLAC TROMETHAMINE 30 MG/ML
30 INJECTION, SOLUTION INTRAMUSCULAR; INTRAVENOUS ONCE
Status: COMPLETED | OUTPATIENT
Start: 2023-03-05 | End: 2023-03-05

## 2023-03-05 RX ORDER — ONDANSETRON 2 MG/ML
4 INJECTION INTRAMUSCULAR; INTRAVENOUS ONCE
Status: COMPLETED | OUTPATIENT
Start: 2023-03-05 | End: 2023-03-05

## 2023-03-05 RX ADMIN — HYDROMORPHONE HYDROCHLORIDE 0.5 MG: 1 INJECTION, SOLUTION INTRAMUSCULAR; INTRAVENOUS; SUBCUTANEOUS at 16:17

## 2023-03-05 RX ADMIN — KETOROLAC TROMETHAMINE 30 MG: 30 INJECTION, SOLUTION INTRAMUSCULAR; INTRAVENOUS at 14:03

## 2023-03-05 RX ADMIN — ONDANSETRON 4 MG: 2 INJECTION INTRAMUSCULAR; INTRAVENOUS at 16:16

## 2023-03-05 RX ADMIN — Medication 10 ML: at 14:04

## 2023-03-05 NOTE — ED PROVIDER NOTES
Time: 1:09 PM EST  Date of encounter:  3/5/2023  Independent Historian/Clinical History and Information was obtained by:   Patient  Chief Complaint: flank pain    History is limited by: N/A    History of Present Illness:  Patient is a 37 y.o. year old female who presents to the emergency department for evaluation of flank pain with onset past few days. Pain is located on right side with radiation to right side of her back. Pt also notes some dysuria as well as some gross hematuria. Pt rates the pain as 8/10. Pt rates the pain as sharp. Pt had a hx of kidney infection.     HPI    Patient Care Team  Primary Care Provider: Michaela Prince APRN    Past Medical History:     Allergies   Allergen Reactions   • Latex Hives     Past Medical History:   Diagnosis Date   • Allergies    • Disease of thyroid gland    • GERD (gastroesophageal reflux disease)    • Heart murmur    • Leaky heart valve      Past Surgical History:   Procedure Laterality Date   • ENDOSCOPY N/A 4/5/2022    Procedure: ESOPHAGOGASTRODUODENOSCOPY;  Surgeon: Indra Boo MD;  Location: [facility] ENDOSCOPY;  Service: General;  Laterality: N/A;  SAME   • TONSILLECTOMY       Family History   Problem Relation Age of Onset   • Cancer Maternal Grandfather    • Malig Hyperthermia Neg Hx        Home Medications:  Prior to Admission medications    Medication Sig Start Date End Date Taking? Authorizing Provider   ergocalciferol (ERGOCALCIFEROL) 1.25 MG (12164 UT) capsule ergocalciferol (vitamin D2) 1,250 mcg (50,000 unit) capsule    Robert Aden MD   fluticasone (FLONASE) 50 MCG/ACT nasal spray 2 sprays into the nostril(s) as directed by provider Daily. 12/14/22   Luís Martínez MD   levothyroxine (SYNTHROID, LEVOTHROID) 25 MCG tablet levothyroxine 25 mcg tablet    Robert Aden MD   loratadine (CLARITIN) 10 MG tablet Take 10 mg by mouth Daily As Needed.    Robert Aden MD   pantoprazole (PROTONIX) 20 MG EC tablet TAKE  ONE TABLET BY MOUTH DAILY  Patient taking differently: 20 mg Daily As Needed. 22   Indra Boo MD   Pediatric Multivitamins-Iron (FLINTSTONES W/IRON PO) Flintstones Gummies chewable tablet   Take by oral route.    Provider, MD Robert   Xiidra 5 % ophthalmic solution  22   Provider, Robert, MD        Social History:   Social History     Tobacco Use   • Smoking status: Former     Packs/day: 1.00     Years: 10.00     Pack years: 10.00     Types: Cigarettes     Quit date: 2016     Years since quittin.2   • Smokeless tobacco: Never   Vaping Use   • Vaping Use: Never used   Substance Use Topics   • Alcohol use: Not Currently   • Drug use: Never         Review of Systems:  Review of Systems   Constitutional: Negative for chills and fever.   HENT: Negative for sore throat.    Eyes: Negative for photophobia.   Respiratory: Negative for shortness of breath.    Cardiovascular: Negative for chest pain.   Gastrointestinal: Positive for nausea. Negative for abdominal pain, diarrhea and vomiting.   Genitourinary: Positive for dysuria, flank pain and hematuria.   Musculoskeletal: Positive for back pain. Negative for neck pain.   Skin: Negative for wound.   Neurological: Negative for headaches.   All other systems reviewed and are negative.       Physical Exam:  There were no vitals taken for this visit.    Physical Exam  Vitals and nursing note reviewed.   Constitutional:       General: She is not in acute distress.  HENT:      Head: Normocephalic and atraumatic.   Eyes:      Extraocular Movements: Extraocular movements intact.   Cardiovascular:      Rate and Rhythm: Normal rate and regular rhythm.   Pulmonary:      Effort: Pulmonary effort is normal. No respiratory distress.      Breath sounds: Normal breath sounds.   Abdominal:      General: Abdomen is flat.      Palpations: Abdomen is soft.      Tenderness: There is no abdominal tenderness. There is no right CVA tenderness.    Musculoskeletal:         General: Normal range of motion.      Cervical back: Normal range of motion and neck supple.   Skin:     General: Skin is warm and dry.      Capillary Refill: Capillary refill takes less than 2 seconds.   Neurological:      Mental Status: She is alert and oriented to person, place, and time. Mental status is at baseline.                  Procedures:  Procedures      Medical Decision Making:      Comorbidities that affect care:    None    External Notes reviewed:    None      The following orders were placed and all results were independently analyzed by me:  No orders of the defined types were placed in this encounter.      Medications Given in the Emergency Department:  Medications - No data to display     ED Course:         Labs:    Lab Results (last 24 hours)     ** No results found for the last 24 hours. **           Imaging:    No Radiology Exams Resulted Within Past 24 Hours      Differential Diagnosis and Discussion:    Abdominal Pain: Based on the patient's signs and symptoms, I considered abdominal aortic aneurysm, small bowel obstruction, pancreatitis, acute cholecystitis, acute appendecitis, peptic ulcer disease, gastritis, colitis, endocrine disorders, irritable bowel syndrome and other differential diagnosis an etiology of the patient's abdominal pain.    All labs were reviewed and interpreted by me.    MDM         Patient Care Considerations:          Consultants/Shared Management Plan:    None    Social Determinants of Health:    Patient is independent, reliable, and has access to care.       Disposition and Care Coordination:            Final diagnoses:   None        ED Disposition     None          This medical record created using voice recognition software.             Darius Calderon  03/05/23 1315       Darius Calderon  03/05/23 1326       Surya Arrieta DO  03/05/23 [id number]

## (undated) DEVICE — EGD OR ERCP KIT: Brand: MEDLINE INDUSTRIES, INC.

## (undated) DEVICE — Device: Brand: DEFENDO AIR/WATER/SUCTION AND BIOPSY VALVE

## (undated) DEVICE — GLV SURG SENSICARE PI LF PF 7.5 GRN STRL

## (undated) DEVICE — SINGLE-USE BIOPSY FORCEPS: Brand: RADIAL JAW 4

## (undated) DEVICE — SOL IRRG H2O PL/BG 1000ML STRL

## (undated) DEVICE — GLV SURG SENSICARE SLT PF LF 7 STRL